# Patient Record
Sex: FEMALE | Race: WHITE
[De-identification: names, ages, dates, MRNs, and addresses within clinical notes are randomized per-mention and may not be internally consistent; named-entity substitution may affect disease eponyms.]

---

## 2017-03-16 ENCOUNTER — HOSPITAL ENCOUNTER (OUTPATIENT)
Dept: HOSPITAL 47 - RADMAMWWP | Age: 52
Discharge: HOME | End: 2017-03-16
Attending: INTERNAL MEDICINE
Payer: COMMERCIAL

## 2017-03-16 DIAGNOSIS — Z12.31: Primary | ICD-10-CM

## 2017-03-27 NOTE — MM
Reason for exam: screening  (asymptomatic).

Last mammogram was performed 5 years and 4 months ago.



History:

Patient is postmenopausal.

Family history of breast cancer in paternal grandmother at age 80.

Benign excisional biopsy of the right breast.



Physical Findings:

A clinical breast exam by your physician is recommended on an annual basis and 

results should be correlated with mammographic findings.



MG Screening Mammo w CAD

Bilateral CC and MLO view(s) were taken.

Prior study comparison: November 1, 2011, mammogram, performed at [place].

The breast tissue is heterogeneously dense. This may lower the sensitivity of 

mammography.  There is no discrete abnormality.  No significant changes when 

compared with prior studies.





ASSESSMENT: Benign, BI-RAD 2



RECOMMENDATION:

Routine screening mammogram of both breasts in 1 year.

## 2018-04-11 ENCOUNTER — HOSPITAL ENCOUNTER (OUTPATIENT)
Dept: HOSPITAL 47 - RADMAMWWP | Age: 53
Discharge: HOME | End: 2018-04-11
Attending: INTERNAL MEDICINE
Payer: COMMERCIAL

## 2018-04-11 DIAGNOSIS — N63.20: Primary | ICD-10-CM

## 2018-04-11 PROCEDURE — 77066 DX MAMMO INCL CAD BI: CPT

## 2018-04-11 NOTE — USB
Reason for exam: clinical finding.



History:

Patient is postmenopausal.

Family history of breast cancer in paternal grandmother at age 80.

Benign excisional biopsy of the right breast.

Indicated problem(s): lump or thickening in the left breast.



US Breast LT

Left breast ultrasound includes all four quadrants, the retroareolar region and 

axilla. Finding demonstrates no cystic or solid lesion seen.



These results were verbally communicated with the patient and result sheet given 

to the patient on 4/11/18.





ASSESSMENT: Negative, BI-RAD 1



RECOMMENDATION:

Routine screening mammogram of both breasts in 1 year.

Manage on a clinical basis with regard to palpable abnormality.

## 2018-04-11 NOTE — MM
Reason for exam: clinical finding.

Last mammogram was performed 1 year and 1 month ago.



History:

Patient is postmenopausal.

Family history of breast cancer in paternal grandmother at age 80.

Benign excisional biopsy of the right breast.

Indicated problem(s): lump or thickening in the left breast.



Physical Findings:

Nurse did not find any significant physical abnormalities on exam.



MG Diagnostic Mammo w CAD [facility]AN[facility]

Bilateral CC and MLO view(s) were taken.

Prior study comparison: March 16, 2017, bilateral MG screening mammo w CAD.  

November 1, 2011, mammogram, performed at [place].

There are scattered fibroglandular densities.  There is no discrete abnormality.

No significant new findings when compared with previous films.



These results were verbally communicated with the patient and result sheet given 

to the patient on 4/11/18.





ASSESSMENT: Benign, BI-RAD 2



RECOMMENDATION:

Routine screening mammogram of both breasts in 1 year.

Manage on a clinical basis with regard to palpable abnormality.

## 2020-07-29 ENCOUNTER — HOSPITAL ENCOUNTER (OUTPATIENT)
Dept: HOSPITAL 47 - LABWHC1 | Age: 55
Discharge: HOME | End: 2020-07-29
Attending: FAMILY MEDICINE
Payer: COMMERCIAL

## 2020-07-29 DIAGNOSIS — E11.9: Primary | ICD-10-CM

## 2020-07-29 DIAGNOSIS — I10: ICD-10-CM

## 2020-07-29 LAB
CHOLEST SERPL-MCNC: 216 MG/DL (ref 0–200)
HBA1C MFR BLD: 7 % (ref 4–6)
HDLC SERPL-MCNC: 52 MG/DL (ref 40–60)
LDLC SERPL CALC-MCNC: 133.4 MG/DL (ref 0–131)
TRIGL SERPL-MCNC: 153 MG/DL (ref 0–149)
VLDLC SERPL CALC-MCNC: 30.6 MG/DL (ref 5–40)

## 2020-07-29 PROCEDURE — 80061 LIPID PANEL: CPT

## 2020-07-29 PROCEDURE — 83036 HEMOGLOBIN GLYCOSYLATED A1C: CPT

## 2020-07-29 PROCEDURE — 36415 COLL VENOUS BLD VENIPUNCTURE: CPT

## 2022-07-07 ENCOUNTER — HOSPITAL ENCOUNTER (INPATIENT)
Dept: HOSPITAL 47 - EC | Age: 57
LOS: 12 days | Discharge: HOME | DRG: 885 | End: 2022-07-19
Attending: PSYCHIATRY & NEUROLOGY | Admitting: PSYCHIATRY & NEUROLOGY
Payer: MEDICAID

## 2022-07-07 DIAGNOSIS — Z71.89: ICD-10-CM

## 2022-07-07 DIAGNOSIS — Z28.21: ICD-10-CM

## 2022-07-07 DIAGNOSIS — Z91.83: ICD-10-CM

## 2022-07-07 DIAGNOSIS — Z63.5: ICD-10-CM

## 2022-07-07 DIAGNOSIS — Z59.00: ICD-10-CM

## 2022-07-07 DIAGNOSIS — R42: ICD-10-CM

## 2022-07-07 DIAGNOSIS — F25.0: Primary | ICD-10-CM

## 2022-07-07 DIAGNOSIS — G47.00: ICD-10-CM

## 2022-07-07 DIAGNOSIS — T43.595A: ICD-10-CM

## 2022-07-07 DIAGNOSIS — F41.9: ICD-10-CM

## 2022-07-07 DIAGNOSIS — Z20.822: ICD-10-CM

## 2022-07-07 DIAGNOSIS — F17.210: ICD-10-CM

## 2022-07-07 LAB
PH UR: 6.5 [PH] (ref 5–8)
RBC UR QL: 1 /HPF (ref 0–5)
SP GR UR: 1.02 (ref 1–1.03)
SQUAMOUS UR QL AUTO: 2 /HPF (ref 0–4)
UROBILINOGEN UR QL STRIP: 2 MG/DL (ref ?–2)
WBC #/AREA URNS HPF: 7 /HPF (ref 0–5)

## 2022-07-07 PROCEDURE — 82075 ASSAY OF BREATH ETHANOL: CPT

## 2022-07-07 PROCEDURE — 82248 BILIRUBIN DIRECT: CPT

## 2022-07-07 PROCEDURE — 81025 URINE PREGNANCY TEST: CPT

## 2022-07-07 PROCEDURE — 85025 COMPLETE CBC W/AUTO DIFF WBC: CPT

## 2022-07-07 PROCEDURE — 87635 SARS-COV-2 COVID-19 AMP PRB: CPT

## 2022-07-07 PROCEDURE — 81001 URINALYSIS AUTO W/SCOPE: CPT

## 2022-07-07 PROCEDURE — 80053 COMPREHEN METABOLIC PANEL: CPT

## 2022-07-07 PROCEDURE — 99285 EMERGENCY DEPT VISIT HI MDM: CPT

## 2022-07-07 PROCEDURE — 80061 LIPID PANEL: CPT

## 2022-07-07 PROCEDURE — 80306 DRUG TEST PRSMV INSTRMNT: CPT

## 2022-07-07 PROCEDURE — 84443 ASSAY THYROID STIM HORMONE: CPT

## 2022-07-07 PROCEDURE — 83036 HEMOGLOBIN GLYCOSYLATED A1C: CPT

## 2022-07-07 SDOH — ECONOMIC STABILITY - HOUSING INSECURITY: HOMELESSNESS UNSPECIFIED: Z59.00

## 2022-07-07 SDOH — SOCIAL STABILITY - SOCIAL INSECURITY: DISRUPTION OF FAMILY BY SEPARATION AND DIVORCE: Z63.5

## 2022-07-07 NOTE — ED
Psych HPI





- General


Source: patient, police, RN notes reviewed


Mode of arrival: ambulatory


Limitations: no limitations





<Renny Crowley - Last Filed: 07/07/22 11:43>





<Rashad Faulkner - Last Filed: 07/07/22 16:05>





- General


Chief Complaint: Psychiatric Symptoms


Stated Complaint: Mental Health  order


Time Seen by Provider: 07/07/22 07:49





- History of Present Illness


Initial Comments: 


This a 57-year-old female presents emergency Department with police for 

psychiatric evaluation.  Patient was petitioned by family member as she's had a 

manic bizarre behavior.  Patient's been having some paranoia.  Patient's no 

prior much information as she has severe flight of ideas, no insight on current 

condition.  She denies any medications she is not given Ancef she has any 

diagnosis of schizophrenia bipolar disorder.  She does admit to some alcohol use

occasionally no drug abuse.  Patient denies any physical complaints.


 (Renny Crowley)





- Related Data


                                    Allergies











Allergy/AdvReac Type Severity Reaction Status Date / Time


 


No Known Allergies Allergy   Verified 07/07/22 12:37














Review of Systems


ROS Other: All systems not noted in ROS Statement are negative.





<Renny Crowley - Last Filed: 07/07/22 11:43>


ROS Other: All systems not noted in ROS Statement are negative.





<Rashad Faulkner - Last Filed: 07/07/22 16:05>


ROS Statement: 


Those systems with pertinent positive or pertinent negative responses have been 

documented in the HPI.








Past Medical History


Past Medical History: No Reported History


History of Any Multi-Drug Resistant Organisms: None Reported


Past Surgical History: No Surgical Hx Reported


Past Psychological History: Anxiety


Smoking Status: Current every day smoker


Past Alcohol Use History: Occasional


Past Drug Use History: None Reported





<Renny Crowley - Last Filed: 07/07/22 11:43>





General Exam


Limitations: no limitations


General appearance: alert, in no apparent distress


Head exam: Present: atraumatic, normocephalic, normal inspection


Eye exam: Present: normal appearance, PERRL, EOMI.  Absent: scleral icterus, 

conjunctival injection, periorbital swelling


ENT exam: Present: normal exam, normal oropharynx, mucous membranes moist


Neck exam: Present: normal inspection, full ROM.  Absent: tenderness, 

meningismus, lymphadenopathy


Respiratory exam: Present: normal lung sounds bilaterally.  Absent: respiratory 

distress, wheezes, rales, rhonchi, stridor


Cardiovascular Exam: Present: regular rate, normal rhythm, normal heart sounds. 

Absent: systolic murmur, diastolic murmur, rubs, gallop, clicks


Neurological exam: Present: alert


Psychiatric exam: Present: manic, other (Patient has severe flight of ideas, 

rambling thoughts.)





<Renny Crowley - Last Filed: 07/07/22 11:43>





Course





                                   Vital Signs











  07/07/22





  07:46


 


Temperature 98.5 F


 


Pulse Rate 101 H


 


Respiratory 18





Rate 


 


Blood Pressure 126/83


 


O2 Sat by Pulse 98





Oximetry 














Medical Decision Making





<Rashad Faulkner - Last Filed: 07/07/22 16:05>





- Medical Decision Making





Patient will be admitted to inpatient psych. I completed the cert after 

evaluation of the patient. (Rashad Faulkner)





- Lab Data





                                   Lab Results











  07/07/22 07/07/22 Range/Units





  08:20 13:10 


 


Urine Opiates Screen  Not Detected   (NotDetected)  


 


Ur Oxycodone Screen  Not Detected   (NotDetected)  


 


Urine Methadone Screen  Not Detected   (NotDetected)  


 


Ur Propoxyphene Screen  Not Detected   (NotDetected)  


 


Ur Barbiturates Screen  Not Detected   (NotDetected)  


 


U Tricyclic Antidepress  Not Detected   (NotDetected)  


 


Ur Phencyclidine Scrn  Not Detected   (NotDetected)  


 


Ur Amphetamines Screen  Not Detected   (NotDetected)  


 


U Methamphetamines Scrn  Not Detected   (NotDetected)  


 


U Benzodiazepines Scrn  Not Detected   (NotDetected)  


 


Urine Cocaine Screen  Not Detected   (NotDetected)  


 


U Marijuana (THC) Screen  Not Detected   (NotDetected)  


 


Coronavirus (PCR)   Not Detected  (Not Detectd)  














Disposition


Time of Disposition: 11:57





<Renny Crowley - Last Filed: 07/07/22 11:43>





<Rashad Faulkner - Last Filed: 07/07/22 16:05>


Clinical Impression: 


 Bipolar disorder





Disposition: TRANSFER TO PSYCH HOSP/UNIT


Condition: Fair

## 2022-07-08 LAB
ALBUMIN SERPL-MCNC: 4.5 G/DL (ref 3.5–5)
ALP SERPL-CCNC: 85 U/L (ref 38–126)
ALT SERPL-CCNC: 18 U/L (ref 4–34)
ANION GAP SERPL CALC-SCNC: 9 MMOL/L
AST SERPL-CCNC: 24 U/L (ref 14–36)
BASOPHILS # BLD AUTO: 0.1 K/UL (ref 0–0.2)
BASOPHILS NFR BLD AUTO: 1 %
BILIRUB INDIRECT SERPL-MCNC: 0.4 MG/DL (ref 0–1.1)
BILIRUBIN DIRECT+TOT PNL SERPL-MCNC: 0.1 MG/DL (ref 0–0.2)
BUN SERPL-SCNC: 14 MG/DL (ref 7–17)
CALCIUM SPEC-MCNC: 9 MG/DL (ref 8.4–10.2)
CHLORIDE SERPL-SCNC: 104 MMOL/L (ref 98–107)
CHOLEST SERPL-MCNC: 209 MG/DL (ref 0–200)
CO2 SERPL-SCNC: 24 MMOL/L (ref 22–30)
EOSINOPHIL # BLD AUTO: 0.1 K/UL (ref 0–0.7)
EOSINOPHIL NFR BLD AUTO: 1 %
ERYTHROCYTE [DISTWIDTH] IN BLOOD BY AUTOMATED COUNT: 4.52 M/UL (ref 3.8–5.4)
ERYTHROCYTE [DISTWIDTH] IN BLOOD: 12.1 % (ref 11.5–15.5)
GLUCOSE SERPL-MCNC: 99 MG/DL (ref 74–99)
HCT VFR BLD AUTO: 41.2 % (ref 34–46)
HDLC SERPL-MCNC: 57.5 MG/DL (ref 40–60)
HGB BLD-MCNC: 13.7 GM/DL (ref 11.4–16)
LDLC SERPL CALC-MCNC: 124.9 MG/DL (ref 0–131)
LYMPHOCYTES # SPEC AUTO: 2.6 K/UL (ref 1–4.8)
LYMPHOCYTES NFR SPEC AUTO: 25 %
MCH RBC QN AUTO: 30.3 PG (ref 25–35)
MCHC RBC AUTO-ENTMCNC: 33.3 G/DL (ref 31–37)
MCV RBC AUTO: 91.1 FL (ref 80–100)
MONOCYTES # BLD AUTO: 0.5 K/UL (ref 0–1)
MONOCYTES NFR BLD AUTO: 5 %
NEUTROPHILS # BLD AUTO: 7.2 K/UL (ref 1.3–7.7)
NEUTROPHILS NFR BLD AUTO: 68 %
PLATELET # BLD AUTO: 243 K/UL (ref 150–450)
POTASSIUM SERPL-SCNC: 4.3 MMOL/L (ref 3.5–5.1)
PROT SERPL-MCNC: 7.2 G/DL (ref 6.3–8.2)
SODIUM SERPL-SCNC: 137 MMOL/L (ref 137–145)
TRIGL SERPL-MCNC: 133 MG/DL (ref 0–149)
VLDLC SERPL CALC-MCNC: 26.6 MG/DL (ref 5–40)
WBC # BLD AUTO: 10.6 K/UL (ref 3.8–10.6)

## 2022-07-08 RX ADMIN — DIVALPROEX SODIUM SCH MG: 250 TABLET, FILM COATED, EXTENDED RELEASE ORAL at 16:05

## 2022-07-08 RX ADMIN — PALIPERIDONE SCH MG: 3 TABLET, EXTENDED RELEASE ORAL at 21:15

## 2022-07-08 RX ADMIN — DIVALPROEX SODIUM SCH MG: 250 TABLET, FILM COATED, EXTENDED RELEASE ORAL at 21:15

## 2022-07-08 RX ADMIN — DIVALPROEX SODIUM SCH: 250 TABLET, FILM COATED, EXTENDED RELEASE ORAL at 13:33

## 2022-07-08 NOTE — P.MDCNMH
History of Present Illness


H&P Date: 07/08/22











This is a 57-year-old female male who was recently brought into the emergency 

department by the police as patient was having manic bazaar behavior as noted by

family at home and patient was petitioned by family member and also having some 

increased paranoia and anxiety.  Patient hesitant to provide any medical 

information on exam although did make mention that she follows or did follow 

with Dr. Clare Alvarez in the outpatient setting and has been doing relatively 

well off any medications and did not need any medications for anything.  Patient

denies any medical history and also mentioned that she will contact us or a 

medical doctor if she seeks medical attention.  Patient reported to smoking and 

occasional drinking and denies any other drug use.  Patient is admitted on [address] 

psychiatric unit petitioned for psychiatric evaluation.  Patient is slightly 

anxious on exam and still exhibiting signs of paranoia.  During the exam patient

also reported she does not want Dr. Alvarez to be notified of this admission.





Review Of Systems:





Constitutional: No fever, no chills, no night sweats.  No weight change.  No 

weakness, fatigue or lethargy.  No daytime sleepiness.


EENT: No headache.  No blurred vision or double vision, no loss of vision.  No 

loss of Hearing, no ringing in the ears, no dizziness.  No nasal drainage or 

congestion.  No epistaxis.  No sore throat.


Lungs: No shortness of breath, cough, no sputum production.  No wheezing.


Cardiovascular: No chest pain, no lower extremity edema.  No palpitations.  No 

paroxysmal nocturnal dyspnea.  No orthopnea.  No lightheadedness or dizziness.  

No syncopal episodes.


Abdominal: Reports some mild lower right quadrant abdominal pain.  No nausea, 

vomiting.  No diarrhea.  No constipation.  No bloody or tarry stools..  No loss 

of appetite.


Genitourinary: No dysuria, increased frequency, urgency.  No urinary retention.


Musculoskeletal: No myalgias.  No muscle weakness, no gait dysfunction, no 

frequent falls.  No back pain.  No neck pain.


Integumentary: No wounds, no lesions.  No rash or pruritus.  No unusual 

bruising.  No change in hair or nails.


Neurologic: No aphasia. No facial droop. No change in mentation. No head injury.

No headache. No paralysis. No paresthesia.


Psychiatric: No depression.  No anxiety.  No mood swings.


Endocrine: No abnormal blood sugars.  No weight change.  No excessive sweating 

or thirst.  No cold intolerance.  











PHYSICAL EXAMINATION: 





GENERAL: The patient is alert and oriented x4,  anxious, paranoid, Well 

developed, well nourished. 


HEENT: Pupils are round and equally reacting to light. EOMI. no scleral icterus.

No conjunctival pallor. Normocephalic, atraumatic. No pharyngeal erythema. No 

thyromegaly.  


CARDIOVASCULAR: S1 and S2  muffled 


PULMONARY: Clear to auscultation with no wheezing or rhonchi noted. 


ABDOMEN: soft.  non-distended, normoactive bowel sounds. No palpable 

organomegaly.  Abdominal binder noted


MUSCULOSKELETAL: No joint swelling or deformity.


EXTREMITIES: No cyanosis, clubbing, or pedal edema. 


NEUROLOGICAL: Gross neurological examination did not reveal any focal deficits. 

Steady gait


SKIN: No rashes. 





Assessment:





Manic episode with psychotic features


Racing thoughts


Anxiety


Possible bipolar disorder


Continued ongoing nicotine dependence


Petition by family for bizarre behavior


Full code





Plan:





Recommend to continue with current medications and management per psychiatry 

services.  Attempted to perform a complete history and physical on the patient 

while admitted to the inpatient psychiatric unit and patient initially was 

agreeable to the visit although became more paranoid and making multiple 

comments that she does not need medications or medical services and does not 

want her primary care provider Dr. Alvarez to be notified of this admission.  

Patient denies any chest pain or shortness of breath, denies lightheadedness or 

dizziness, reports she is not taking any medications in the outpatient setting, 

and denies any nausea or vomiting and is tolerating diet.  Attempted to 

encourage the patient to follow-up with primary care provider to discuss any med

ications once discharged from the psychiatric unit.  She has been petitioned by 

family and brought here by police and involuntarily signed into the psychiatric 

unit.  Labs were reviewed and within normal limits and urine drug screen along 

with Covid that was also reviewed and negative.  Thank you for this consultation

and please do not hesitate to contact us if needed.








The impression and plan of care has been dictated by Yari Stauffer, nurse 

practitioner as directed.





Dr. Rand MD


I have performed a history and examination and MDM of this patient, discussed 

the same with the dictator, and  agree with the dictator's assessment and plan 

as written ,documented as a scribe. Based on total visit time,  I have performed

more than 50% of the visit. Any additional findings or plans will be noted. 





Past Medical History


Past Medical History: No Reported History


History of Any Multi-Drug Resistant Organisms: None Reported


Past Surgical History: No Surgical Hx Reported


Past Anesthesia/Blood Transfusion Reactions: No Reported Reaction


Past Psychological History: Anxiety


Smoking Status: Current every day smoker


Past Alcohol Use History: None Reported, Occasional


Past Drug Use History: None Reported





Medications and Allergies


                                Home Medications











 Medication  Instructions  Recorded  Confirmed  Type


 


No Known Home Medications  07/07/22 07/07/22 History








                                    Allergies











Allergy/AdvReac Type Severity Reaction Status Date / Time


 


No Known Allergies Allergy   Verified 07/07/22 12:37














Physical Exam


Vitals: 


                                   Vital Signs











  Temp Pulse Pulse Resp BP BP Pulse Ox


 


 07/08/22 07:12  97.9 F   109 H  18   120/85  98


 


 07/07/22 15:57  98 F   97  18   156/85  96


 


 07/07/22 14:16   94   18  124/81   96








                                Intake and Output











 07/07/22 07/08/22 07/08/22





 22:59 06:59 14:59


 


Other:   


 


  Weight 73.074 kg  














Cranial Nerve Examination





- Cranial Nerves


Cranial Nerve I- Olfactory: Intact


Cranial Nerve II- Optic: Intact


Cranial Nerve III- Oculomotor: Intact


Cranial Nerve IV- Trochlear: Intact


Cranial Nerve V- Trigeminal: Intact


Cranial Nerve VI- Abducens: Intact


Cranial Nerve VII- Facial: Intact


Cranial Nerve VIII- Auditory: Intact


Cranial Nerve IX- Glossopharyngeal: Intact


Cranial Nerve X- Vagus: Intact


Cranial Nerve XI- Accessory: Intact


Cranial Nerve XII- Hypoglossal: Intact





Results


CBC & Chem 7: 


                                 07/08/22 10:27





                                 07/08/22 10:27


Labs: 


                  Abnormal Lab Results - Last 24 Hours (Table)











  07/07/22 Range/Units





  08:20 


 


Urine Appearance  Cloudy H  (Clear)  


 


Urine Protein  Trace H  (Negative)  


 


Urine WBC  7 H  (0-5)  /hpf


 


Urine Bacteria  Occasional H  (None)  /hpf


 


Urine Mucus  Few H  (None)  /hpf














Assessment and Plan


Time with Patient: Less than 30

## 2022-07-08 NOTE — P.HP
Psychiatric H&P





- .


H&P Date: 07/08/22


History & Physical: 


                                    Allergies











Allergy/AdvReac Type Severity Reaction Status Date / Time


 


No Known Allergies Allergy   Verified 07/07/22 12:37








                                   Vital Signs











Temp  97.9 F   07/08/22 07:12


 


Pulse  109 H  07/08/22 07:12


 


Resp  18   07/08/22 07:12


 


BP  120/85   07/08/22 07:12


 


Pulse Ox  98   07/08/22 07:12


 


FiO2      








                                 Intake & Output











 07/07/22 07/08/22 07/08/22





 18:59 06:59 18:59


 


Weight 73.074 kg  








                             Laboratory Last Values











WBC  10.6 k/uL (3.8-10.6)   07/08/22  10:27    


 


RBC  4.52 m/uL (3.80-5.40)   07/08/22  10:27    


 


Hgb  13.7 gm/dL (11.4-16.0)   07/08/22  10:27    


 


Hct  41.2 % (34.0-46.0)   07/08/22  10:27    


 


MCV  91.1 fL (80.0-100.0)   07/08/22  10:27    


 


MCH  30.3 pg (25.0-35.0)   07/08/22  10:27    


 


MCHC  33.3 g/dL (31.0-37.0)   07/08/22  10:27    


 


RDW  12.1 % (11.5-15.5)   07/08/22  10:27    


 


Plt Count  243 k/uL (150-450)   07/08/22  10:27    


 


MPV  8.4   07/08/22  10:27    


 


Neutrophils %  68 %  07/08/22  10:27    


 


Lymphocytes %  25 %  07/08/22  10:27    


 


Monocytes %  5 %  07/08/22  10:27    


 


Eosinophils %  1 %  07/08/22  10:27    


 


Basophils %  1 %  07/08/22  10:27    


 


Neutrophils #  7.2 k/uL (1.3-7.7)   07/08/22  10:27    


 


Lymphocytes #  2.6 k/uL (1.0-4.8)   07/08/22  10:27    


 


Monocytes #  0.5 k/uL (0-1.0)   07/08/22  10:27    


 


Eosinophils #  0.1 k/uL (0-0.7)   07/08/22  10:27    


 


Basophils #  0.1 k/uL (0-0.2)   07/08/22  10:27    


 


Urine Color  Yellow   07/07/22  08:20    


 


Urine Appearance  Cloudy  (Clear)  H  07/07/22  08:20    


 


Urine pH  6.5  (5.0-8.0)   07/07/22  08:20    


 


Ur Specific Gravity  1.025  (1.001-1.035)   07/07/22  08:20    


 


Urine Protein  Trace  (Negative)  H  07/07/22  08:20    


 


Urine Glucose (UA)  Negative  (Negative)   07/07/22  08:20    


 


Urine Ketones  Negative  (Negative)   07/07/22  08:20    


 


Urine Blood  Negative  (Negative)   07/07/22  08:20    


 


Urine Nitrite  Negative  (Negative)   07/07/22  08:20    


 


Urine Bilirubin  Negative  (Negative)   07/07/22  08:20    


 


Urine Urobilinogen  2.0 mg/dL (<2.0)   07/07/22  08:20    


 


Ur Leukocyte Esterase  Negative  (Negative)   07/07/22  08:20    


 


Urine RBC  1 /hpf (0-5)   07/07/22  08:20    


 


Urine WBC  7 /hpf (0-5)  H  07/07/22  08:20    


 


Ur Squamous Epith Cells  2 /hpf (0-4)   07/07/22  08:20    


 


Urine Bacteria  Occasional /hpf (None)  H  07/07/22  08:20    


 


Urine Mucus  Few /hpf (None)  H  07/07/22  08:20    


 


Urine HCG, Qual  Not Detected  (Not Detectd)   07/07/22  08:20    


 


Urine Opiates Screen  Not Detected  (NotDetected)   07/07/22  08:20    


 


Ur Oxycodone Screen  Not Detected  (NotDetected)   07/07/22  08:20    


 


Urine Methadone Screen  Not Detected  (NotDetected)   07/07/22  08:20    


 


Ur Propoxyphene Screen  Not Detected  (NotDetected)   07/07/22  08:20    


 


Ur Barbiturates Screen  Not Detected  (NotDetected)   07/07/22  08:20    


 


U Tricyclic Antidepress  Not Detected  (NotDetected)   07/07/22  08:20    


 


Ur Phencyclidine Scrn  Not Detected  (NotDetected)   07/07/22  08:20    


 


Ur Amphetamines Screen  Not Detected  (NotDetected)   07/07/22  08:20    


 


U Methamphetamines Scrn  Not Detected  (NotDetected)   07/07/22  08:20    


 


U Benzodiazepines Scrn  Not Detected  (NotDetected)   07/07/22  08:20    


 


Urine Cocaine Screen  Not Detected  (NotDetected)   07/07/22  08:20    


 


U Marijuana (THC) Screen  Not Detected  (NotDetected)   07/07/22  08:20    


 


Coronavirus (PCR)  Not Detected  (Not Detectd)   07/07/22  13:10    











07/08/22 11:31


IDENTIFYING DATA: Patient is a 57-year-old  female currently ,

homeless and has one son.





HPI: Patient presented to the hospital yesterday with police escort for 

psychiatric evaluation.  Patient apparently was petitioned by her family.  

Patient was seen to be disorganized, pressured in speech and also bizarre.  

Patient was admitted involuntarily to the mental health unit.  Patient's UDS was

negative.  Patient was seen today by writer in the office and agreeable to 

speak.  Patient had pressured speech, rambles flight of ideas.  She was 

tangential/circumstantial.  She spoke about trying to get to a shelter and 

claims that she is "in between houses".  She states that she is having financial

difficulties and was not able to pay her rent.  She states that she was walking 

down the side of the road with her bags in her hand and was pulled over by the 

police and taken to the hospital.  She states that she does not know why.  She 

has fairly poor insight into her condition.  She does state that she has a 

history of bipolar disorder however took herself off her medication states that 

"I don't think I need them".  She is grandiose and claims that she is "high 

strung".  She is denying any depression today.  Denying any anxiety.  She does 

endorse some paranoia.  She also is endorsing auditory hallucinations coming 

through the "baseboards in the wall".  She states her sleep has been fair 

appetite as been fair. Patient denies any suicidal or homicidal ideations intent

or plan.  At this time patient denies any visual hallucinations. Patient admits 

to using cigarettes daily.





PAST PSYCHIATRIC HISTORY: Patient states that she has a history of bipolar 

disorder. Patient denies being on any psychiatric medications. Patient denies 

any previous psychiatric hospitalizations. Patient denies any psychiatric 

outpatient follow-up. Patient denies any history of suicide attempts in the 

past.





PMH:denies





ALLERGIES: as per EMR





CHEMICAL DEPENDENCY HISTORY: as per HPI





FAMILY PSYCHIATRIC/SUBSTANCE USE HISTORY:  denies





SOCIAL HISTORY: Patient was born and raised in [place].  She states that she 

completed high school and also her bachelor's degree.  She was unreliable 

historian of her social history.





MENTAL STATUS EXAM: 


General Appearance: Patient appears to be dressed in home in street clothing, 

stated age is alert, difficult to redirect. Patient appears to have poor hygiene

and grooming.


Behavior: Patient is seated without any agitated behavior.  Difficult to 

redirect.


Speech: Patient's speech is rapid/pressured.  Rambles.


Mood/Affect: Patient reports their mood is "okay", affect is congruent and 

constricted. 


Suicidality/Homicidality:  Patient denies having any homicidal ideation intent 

or plan. Denies any suicidal ideations intent or plan  


Perceptions: Patient denies any visual hallucinations at [facility] auditory 

hallucinations.


Though content/process: Tangential/circumstantial.  Rambling, flight of ideas.  

Grandiosity. 


Memory and concentration: AOX3, grossly intact for the purposes of this session.

Can spell "WORLD" backwards


Judgment and insight: poor





STRENGTHS/WEAKNESSES: strength is that patient is resilient. Weakness is that 

patient has poor judgment and is impulsive





INTELLECT: average





IMPRESSIONS: 


Bipolar disorder, current episode kia with psychotic features





PLAN: 


-Patient is admitted under involuntary status to MHU for stabilization of 

psychiatric symptoms and safety. Patient has not signed adult voluntary form and

medication consent and is placed in patient's chart. A second certification was 

completed and along with petition will be filed for court.


-Medications : Will start patient on Depakote 250 mg 3 times a day for mood 

stabilization, paliperidone by mouth 3 mg daily at bedtime for mood 

stabilization/psychosis.


-Ativan and Haldol PRN for agitation/aggression


-Patient was informed of the risks, benefits and side effects of the medication 


-Internal Medicine consult to perform medical evaluation and physical.


-NRT - nicotine patch


-SW on board for discharge planning. Encourage patient to participate in groups 

to work on coping skills. Will await deferral and court date. 


07/08/22 13:10

## 2022-07-09 RX ADMIN — DIVALPROEX SODIUM SCH MG: 250 TABLET, FILM COATED, EXTENDED RELEASE ORAL at 08:38

## 2022-07-09 RX ADMIN — DIVALPROEX SODIUM SCH MG: 250 TABLET, FILM COATED, EXTENDED RELEASE ORAL at 16:17

## 2022-07-09 RX ADMIN — DIVALPROEX SODIUM SCH MG: 250 TABLET, FILM COATED, EXTENDED RELEASE ORAL at 20:39

## 2022-07-09 RX ADMIN — NICOTINE SCH: 14 PATCH, EXTENDED RELEASE TRANSDERMAL at 08:37

## 2022-07-09 RX ADMIN — PALIPERIDONE SCH MG: 3 TABLET, EXTENDED RELEASE ORAL at 20:39

## 2022-07-09 NOTE — P.PN
Subjective


Progress Note Date: 07/09/22


Principal diagnosis: 








IMPRESSIONS: 


Bipolar disorder, current episode kia with psychotic features


Patient is a 72 poor historian and continues to ramble with circumstantiality an

d tangentiality and is unable to give any specific information


Patient reports that she does not call hospitalization as hospitals but more 

like different residence and that she has been changing her residence multiple 

times


She states that she has been in this residence few of the times


She stated that she is also trying to change her residence to get a bigger place

so she can accommodate more people


Patient continues to ramble on with information that remains noncontributory and

projective





Reviewing the chart from the previous day reveals the following information:





'HPI: Patient presented to the hospital yesterday with police escort for 

psychiatric evaluation.  Patient apparently was petitioned by her family.  

Patient was seen to be disorganized, pressured in speech and also bizarre.  

Patient was admitted involuntarily to the mental health unit.  Patient's UDS was

negative.  Patient was seen today by writer in the office and agreeable to 

speak.  Patient had pressured speech, rambles flight of ideas.  She was 

tangential/circumstantial.  She spoke about trying to get to a shelter and 

claims that she is "in between houses".  She states that she is having financial

difficulties and was not able to pay her rent.  She states that she was walking 

down the side of the road with her bags in her hand and was pulled over by the 

police and taken to the hospital.  She states that she does not know why.  She 

has fairly poor insight into her condition.  She does state that she has a his

tory of bipolar disorder however took herself off her medication states that "I 

don't think I need them".  She is grandiose and claims that she is "high 

strung".  She is denying any depression today.  Denying any anxiety.  She does 

endorse some paranoia.  She also is endorsing auditory hallucinations coming 

through the "baseboards in the wall".  She states her sleep has been fair 

appetite as been fair. Patient denies any suicidal or homicidal ideations intent

or plan.  At this time patient denies any visual hallucinations. Patient admits 

to using cigarettes daily.








MENTAL STATUS EXAM: 


General Appearance: Patient appears to be dressed in home in street clothing, 

stated age is alert, difficult to redirect. Patient appears to have poor hygiene

and grooming.


Behavior: Patient is seated without any agitated behavior.  Difficult to 

redirect.


Speech: Patient's speech is rapid/pressured.  Rambles.


Mood/Affect: Patient reports their mood is "okay", affect is congruent and 

constricted. 


Suicidality/Homicidality:  Patient denies having any homicidal ideation intent 

or plan. Denies any suicidal ideations intent or plan  


Perceptions: Patient denies any visual hallucinations at [place] auditory ha

llucinations.


Though content/process: Tangential/circumstantial.  Rambling, flight of ideas.  

Grandiosity. 


Memory and concentration: AOX3, grossly intact for the purposes of this session.

Can spell "WORLD" backwards


Judgment and insight: poor





STRENGTHS/WEAKNESSES: strength is that patient is resilient. Weakness is that 

patient has poor judgment and is impulsive





INTELLECT: average





IMPRESSIONS: 


Bipolar disorder, current episode kia with psychotic features





PLAN: 


-Patient is admitted under involuntary status to MHU for stabilization of 

psychiatric symptoms and safety.


 Patient has not signed adult voluntary form and medication consent and is 

placed in patient's chart.


 A second certification as also being completed and along with petition will be 

filed for court.


-Medications : Patient has been started on on Depakote 250 mg 3 times a day for 

mood stabilization,


 paliperidone by mouth 3 mg daily at bedtime for mood stabilization/psychosis.


-Ativan and Haldol PRN for agitation/aggression


-Patient was informed of the risks, benefits and side effects of the medication 


-Internal Medicine consult to perform medical evaluation and physical.


-NRT - nicotine patch


-SW on board for discharge planning. Encourage patient to participate in groups 

to work on coping skills. Will await deferral and court date. 


Louis Hannah M.D.


7/9/2022














Objective





- Vital Signs


Vital signs: 


                                   Vital Signs











Temp  97.8 F   07/09/22 06:56


 


Pulse  103 H  07/09/22 06:56


 


Resp  18   07/08/22 07:12


 


BP  147/60   07/09/22 06:56


 


Pulse Ox  97   07/09/22 06:56


 


FiO2      














- Labs


CBC & Chem 7: 


                                 07/08/22 10:27





                                 07/08/22 10:27


Labs: 


                  Abnormal Lab Results - Last 24 Hours (Table)











  07/08/22 Range/Units





  10:27 


 


Cholesterol  209.00 H  (0..00)  mg/dL

## 2022-07-10 RX ADMIN — PALIPERIDONE SCH MG: 3 TABLET, EXTENDED RELEASE ORAL at 21:44

## 2022-07-10 RX ADMIN — DIVALPROEX SODIUM SCH MG: 250 TABLET, FILM COATED, EXTENDED RELEASE ORAL at 08:30

## 2022-07-10 RX ADMIN — NICOTINE SCH: 14 PATCH, EXTENDED RELEASE TRANSDERMAL at 08:30

## 2022-07-10 RX ADMIN — DIVALPROEX SODIUM SCH MG: 250 TABLET, FILM COATED, EXTENDED RELEASE ORAL at 21:44

## 2022-07-10 RX ADMIN — DIVALPROEX SODIUM SCH MG: 250 TABLET, FILM COATED, EXTENDED RELEASE ORAL at 16:12

## 2022-07-10 NOTE — P.PN
Subjective


Progress Note Date: 07/10/22


Principal diagnosis: 








IMPRESSIONS: 


Bipolar disorder, current episode kia with psychotic features


Patient agreed to be seen by this writer in the hallway


Patient states that she needs to be excused for a little while since she was 

talking to herself


Patient seemed to be carrying on a conversation by herself walking down the 

hallway


She seemed to be responding to internal stimuli


She denies that she has any problems or issues


Patient remains in denial and remains projective and continues to rationalize

















MENTAL STATUS EXAM: 


General Appearance: Patient appears to be dressed in hospital gown,  difficult 

to redirect. Patient appears to have poor hygiene and grooming.


Behavior: Patient is walking around without any agitated behavior.  Difficult to

redirect.


Speech: Patient's speech is rapid/pressured.  Rambles.


Mood/Affect: Patient reports their mood is "okay", affect is congruent and 

constricted. 


Suicidality/Homicidality:  Patient denies having any homicidal ideation intent 

or plan. Denies any suicidal ideations intent or plan  


Perceptions: Patient denies any visual hallucinations at [place] auditory 

hallucinations.


Though content/process: Tangential/circumstantial.  Rambling, flight of ideas.  

Grandiosity. 


Memory and concentration: AOX3, grossly intact for the purposes of this session.




Judgment and insight: poor





STRENGTHS/WEAKNESSES: strength is that patient is resilient. Weakness is that 

patient has poor judgment and is impulsive





INTELLECT: average





IMPRESSIONS: 


Bipolar disorder, current episode kia with psychotic features





PLAN: 


-Patient is admitted under involuntary status to MHU for stabilization of 

psychiatric symptoms and safety.


 Patient has not signed adult voluntary form and medication consent and is 

placed in patient's chart.


 A second certification as also being completed and along with petition will be 

filed for court.


-Medications : Patient has been started on on Depakote 250 mg 3 times a day for 

mood stabilization,


 paliperidone by mouth 3 mg daily at bedtime for mood stabilization/psychosis.


-Ativan and Haldol PRN for agitation/aggression


-Patient was informed of the risks, benefits and side effects of the medication 


-Internal Medicine consult to perform medical evaluation and physical.


-NRT - nicotine patch


-SW on board for discharge planning. Encourage patient to participate in groups 

to work on coping skills. Will await deferral and court date. 


Louis Hannah M.D.


7/10/2022














Objective





- Vital Signs


Vital signs: 


                                   Vital Signs











Temp  97.6 F   07/10/22 06:46


 


Pulse  113 H  07/10/22 06:46


 


Resp  18   07/10/22 06:46


 


BP  113/75   07/10/22 06:46


 


Pulse Ox  98   07/10/22 06:46


 


FiO2      








                                 Intake & Output











 07/09/22 07/10/22 07/10/22





 18:59 06:59 18:59


 


Weight   73.9 kg














- Labs


CBC & Chem 7: 


                                 07/08/22 10:27





                                 07/08/22 10:27

## 2022-07-11 RX ADMIN — DIVALPROEX SODIUM SCH MG: 500 TABLET, FILM COATED, EXTENDED RELEASE ORAL at 15:57

## 2022-07-11 RX ADMIN — DIVALPROEX SODIUM SCH MG: 500 TABLET, FILM COATED, EXTENDED RELEASE ORAL at 20:14

## 2022-07-11 RX ADMIN — DIVALPROEX SODIUM SCH MG: 250 TABLET, FILM COATED, EXTENDED RELEASE ORAL at 09:03

## 2022-07-11 RX ADMIN — NICOTINE SCH: 14 PATCH, EXTENDED RELEASE TRANSDERMAL at 09:04

## 2022-07-11 NOTE — PN
PROGRESS NOTE



DATE OF SERVICE:

07/11/2022



CHIEF COMPLAINT:

The patient had disorganized and bizarre behavior. She had pressured speech.  She was

admitted on an involuntary basis.



INTERVAL HISTORY:

The patient has been doing fair.  She had a quiet day yesterday though continued to be

fairly symptomatic.  She comes out on the unit.  She wanders about.  She did not attend

groups. She was noted to be responding to internal stimuli.  Staff documented that she

slept 5 hours last night. Today she has been up and out on the unit.  She continues to

wander about. She has disorganized speech.  She attended two groups to a limited

extent.  When I talked to the patient, it was difficult to engage much in conversation.

She voiced no specific complaints or concerns.  She appears to tolerate her

psychotropic medications.



MENTAL STATUS:

Patient was restless. She gave fair eye contact.  She made various comments that were

disconnected from the subject at hand.  At times she had some pressured speech and

flight of ideas.  She spoke in a rambling way and it was difficult to make sense of

most of what she was referring to.  Her affect was intense.  Her mood was quiet.  She

appeared to be somewhat distressed.  She continues to respond to internal stimuli.  I

was difficult to assess for thoughts of harm.



ASSESSMENT:

I will continue the current diagnosis and treatment plan.  I will increase Depakote up

to 500 mg twice a day.  We will get a Depakote level tomorrow morning.  I will increase

her Invega to 6 mg a day.  I briefly reviewed medication issues with the patient though

kept the discussion limited, as she was not too inclined to engage in the conversation.

We will focus on stabilization and discharge planning.





MMJEREMIAHL / CONNIEN: [id number] / Job#: [id number]

## 2022-07-12 RX ADMIN — DIVALPROEX SODIUM SCH MG: 500 TABLET, FILM COATED, EXTENDED RELEASE ORAL at 12:04

## 2022-07-12 RX ADMIN — DIVALPROEX SODIUM SCH MG: 500 TABLET, FILM COATED, EXTENDED RELEASE ORAL at 21:20

## 2022-07-12 RX ADMIN — NICOTINE SCH: 14 PATCH, EXTENDED RELEASE TRANSDERMAL at 08:48

## 2022-07-12 NOTE — P.PN
Progress Note - Text


Progress Note Date: 07/12/22





Interval History:


Patient was seen wandering the hallways and was directable and agreeable to antonino schaefer with writer in the office.  Patient continues to have rapid speech however 

this has been improving.  Continues to be tangential/circumstantial arbors 

improving mildly.  She rambles at times during conversation.  She states that 

her mood is "okay" and is denying any depression or anxiety today.  She is 

showing improvement in her grandiosity.  She continues to state that she is 

homeless and does not know where to go upon discharge.  She states that she is 

only gone to some groups.  She states that she is improving overall and is 

agreeable to continue with medications.  She states that she was able to sleep 

better last night. At this time patient denies any suicidal or homical 

ideations, intent or plan. Patient denies any auditory, visual hallucinations 

and denies any paranoia or delusions. Patient denies any side effects from the 

medications and has been compliant with meds.  She did claim that she is feeling

dizziness and intolerability to the paliperidone at nighttime and is agreeable 

to have switched.





Mental Status Exam:


General Appearance: Patient appears to be dressed in home in street clothing, 

stated age is alert, directable. Patient appears to have improving hygiene and 

grooming.


Behavior: Patient is seated without any agitated behavior.  Difficult to 

redirect


Speech: Patient's speech is rapid/pressured.  Rambles, improving mildly


Mood/Affect: Patient reports their mood is "ok", affect is congruent


Suicidality/Homicidality:  Patient denies having any homicidal ideation intent 

or plan. Denies any suicidal ideations intent or plan  


Perceptions: Patient denies any visual hallucinations and denies any auditory 

hallucinations.


Though content/process: Tangential/circumstantial.  Rambling, flight of ideas.  

Grandiosity.  Improving.


Memory and concentration: AOX3, grossly intact for the purposes of this session


Judgment and insight: poor, improving mildly.





IMPRESSIONS: 


Bipolar disorder, current episode kia with psychotic features





Plan:


-Patient continues to meet criteria for inpatient psychiatric admission for 

symptom stabilization and safety. Patient has not signed adult voluntary form 

and medication consent and was placed in patient's chart.


-Medications: Discontinue paliperidone by mouth or place with Seroquel 100 mg 

daily at bedtime for mood stabilization/psychosis/insomnia.  Continue on with 

Depakote 500 mg twice a day for mood stabilization.


-When necessary Ativan and Haldol for agitation/aggression.


-NRT -not needed as patient does not smoke


-SW on board for discharge planning.  Encouraged the patient to participate in 

milieu.  Patient has deferral today with her .  Likely discharge in 1-2 

days.

## 2022-07-13 RX ADMIN — DIVALPROEX SODIUM SCH MG: 500 TABLET, FILM COATED, EXTENDED RELEASE ORAL at 09:05

## 2022-07-13 RX ADMIN — DIVALPROEX SODIUM SCH MG: 500 TABLET, FILM COATED, EXTENDED RELEASE ORAL at 20:03

## 2022-07-13 NOTE — P.PN
Progress Note - Text


Progress Note Date: 07/13/22





Interval History:


Patient was seen wandering the hallways and was directable and agreeable to sp

silvano with writer in the office.  Patient continues to ramble at times, 

tangential/circumstantial in thought process.  She continues to demonstrate a 

fairly poor insight and judgment.  She is denying any changes in her mood and 

states that she feels "fine".  We spoke about her not deferring yesterday with 

the  however she was fairly focused on discharge today.  She claims that

she is going to some groups intending to participate.  She is not endorsing 

paranoia or delusions today.  She states that she was able to sleep better last 

night. At this time patient denies any suicidal or homical ideations, intent or 

plan. Patient denies any auditory, visual hallucinations and denies any paranoia

or delusions. Patient denies any side effects from the medications and has been 

compliant with meds. 





Mental Status Exam:


General Appearance: Patient appears to be dressed in home in street clothing, 

stated age is alert, directable. Patient appears to have improving hygiene and 

grooming.


Behavior: Patient is seated without any agitated behavior.  Her directable 

today.


Speech: Patient's speech is rapid/pressured.  Rambles, improving mildly


Mood/Affect: Patient reports their mood is "fine", affect is congruent


Suicidality/Homicidality:  Patient denies having any homicidal ideation intent 

or plan. Denies any suicidal ideations intent or plan  


Perceptions: Patient denies any visual hallucinations and denies any auditory 

hallucinations.


Though content/process: Tangential/circumstantial.  Rambling, flight of ideas, 

improving


Memory and concentration: AOX3, grossly intact for the purposes of this session


Judgment and insight: poor, improving mildly.





IMPRESSIONS: 


Bipolar disorder, current episode kia with psychotic features





Plan:


-Patient continues to meet criteria for inpatient psychiatric admission for 

symptom stabilization and safety. Patient has not signed adult voluntary form 

and medication consent and was placed in patient's chart.


-Medications: Seroquel 100 mg daily at bedtime for mood 

stabilization/psychosis/insomnia. Continue on with Depakote 500 mg twice a day 

for mood stabilization.


-When necessary Ativan and Haldol for agitation/aggression.


-NRT -not needed as patient does not smoke


-SW on board for discharge planning.  Encouraged the patient to participate in 

milieu. Patient did not defer with  and awaiting court date on 7/27.

## 2022-07-14 RX ADMIN — DIVALPROEX SODIUM SCH MG: 500 TABLET, FILM COATED, EXTENDED RELEASE ORAL at 21:12

## 2022-07-14 RX ADMIN — DIVALPROEX SODIUM SCH MG: 500 TABLET, FILM COATED, EXTENDED RELEASE ORAL at 08:57

## 2022-07-14 NOTE — P.PN
Progress Note - Text


Progress Note Date: 07/14/22





Interval History:


Patient was seen wandering the hallways and was directable and agreeable to antonino schaefer with writer in the office.  Patient continues to ramble at times, 

tangential/circumstantial in thought process, mildly improving.  She continues 

to demonstrate a fairly poor insight and judgment which appears to be chronic.  

she claims that her mood is okay at this time and is denying any depression or 

anxiety.  She asked several questions about the court process and the 

involvement of the drugs today.  She appears to be difficult to interrupt and 

redirect during conversation.  She states that she slept fairly well last night.

She is not endorsing paranoia or delusions today. At this time patient denies 

any suicidal or homical ideations, intent or plan. Patient denies any auditory, 

visual hallucinations and denies any paranoia or delusions. Patient denies any 

side effects from the medications and has been compliant with meds. 





Mental Status Exam:


General Appearance: Patient appears to be dressed in home in street clothing, 

stated age is alert, directable. Patient appears to have improving hygiene and 

grooming


Behavior: Patient is seated without any agitated behavior. more directable 

today.


Speech: Patient's speech is rapid/pressured. Rambles, improving mildly


Mood/Affect: Patient reports their mood is "ok", affect is congruent


Suicidality/Homicidality:  Patient denies having any homicidal ideation intent 

or plan. Denies any suicidal ideations intent or plan  


Perceptions: Patient denies any visual hallucinations and denies any auditory 

hallucinations.


Though content/process: Tangential/circumstantial. Rambling, flight of ideas, 

improving


Memory and concentration: AOX3, grossly intact for the purposes of this session


Judgment and insight: chronically poor, improving mildly.





IMPRESSIONS: 


schizoaffective disorder, bipolar type





Plan:


-Patient continues to meet criteria for inpatient psychiatric admission for 

symptom stabilization and safety. Patient has not signed adult voluntary form 

and medication consent and was placed in patient's chart.


-Medications: increase Seroquel 150 mg daily at bedtime for mood 

stabilization/psychosis/insomnia. Continue on with Depakote 500 mg twice a day 

for mood stabilization.


-check depakote level tomorrow morning.


-When necessary Ativan and Haldol for agitation/aggression.


-NRT -not needed as patient does not smoke


-SW on board for discharge planning.  Encouraged the patient to participate in 

milieu. Patient did not defer with  and awaiting court date on 7/27.

## 2022-07-15 VITALS — RESPIRATION RATE: 16 BRPM

## 2022-07-15 RX ADMIN — DIVALPROEX SODIUM SCH MG: 500 TABLET, FILM COATED, EXTENDED RELEASE ORAL at 08:44

## 2022-07-15 RX ADMIN — DIVALPROEX SODIUM SCH: 500 TABLET, FILM COATED, EXTENDED RELEASE ORAL at 09:43

## 2022-07-15 NOTE — P.PN
Progress Note - Text


Progress Note Date: 07/15/22





Interval History:


Patient was seen wandering the hallways and was directable and agreeable to antonino schaefer with writer in the office.  Patient continues to ramble at times, 

tangential/circumstantial in thought process, mildly improving.  She is still 

difficult to interrupt and redirect during conversation.  She made some 

illogical statements during conversation and spoke about her marriage.  She 

claims that she did not take the Depakote this morning however did not give a 

clear reason why.  She claims that she would prefer to take the Depakote at 

nighttime at 750 mg dose.  She was agreeable to have her Seroquel increased.  

She states that she is going to some groups. She states that she slept fairly 

well last night. She is not endorsing paranoia or delusions today. At this time 

patient denies any suicidal or homical ideations, intent or plan. Patient denies

any auditory, visual hallucinations and denies any paranoia or delusions. 

Patient denies any side effects from the medications and has been compliant with

meds. 





Mental Status Exam:


General Appearance: Patient appears to be dressed in home in street clothing, 

stated age is alert, directable. Patient appears to have improving hygiene and 

grooming


Behavior: Patient is seated without any agitated behavior. more directable 

today.


Speech: Patient's speech is rapid/pressured. Rambles, improving mildly


Mood/Affect: Patient reports their mood is "alright", affect is congruent


Suicidality/Homicidality:  Patient denies having any homicidal ideation intent 

or plan. Denies any suicidal ideations intent or plan  


Perceptions: Patient denies any visual hallucinations and denies any auditory 

hallucinations.


Though content/process: Tangential/circumstantial. Rambling, flight of ideas, 

improving


Memory and concentration: AOX3, grossly intact for the purposes of this session


Judgment and insight: chronically poor, improving mildly.





IMPRESSIONS: 


schizoaffective disorder, bipolar type





Plan:


-Patient continues to meet criteria for inpatient psychiatric admission for 

symptom stabilization and safety. Patient has not signed adult voluntary form 

and medication consent and was placed in patient's chart.


-Medications: increase Seroquel 200 mg daily at bedtime for mood 

stabilization/psychosis/insomnia.  As per patient's request, will change 

Depakote ER to 750 mg daily at bedtime for mood stabilization. 


-When necessary Ativan and Haldol for agitation/aggression.


-NRT -not needed as patient does not smoke


-SW on board for discharge planning.  Encouraged the patient to participate in 

milieu. Patient did not defer with  and awaiting court date on 7/27. 

patient signed waive and stip. likely discharge monday if patient continues to 

improve over the weekend.

## 2022-07-16 RX ADMIN — DIVALPROEX SODIUM SCH MG: 500 TABLET, FILM COATED, EXTENDED RELEASE ORAL at 20:49

## 2022-07-16 NOTE — P.PN
Progress Note - Text


Progress Note Date: 07/16/22


Interval History:


Patient knocked on the psychiatrist's office door and asked to be seen. She she

ears intrusive and irritable, can be difficult to redirect at times. Her thought

process consists of loose associations, tangential/circumstantial, and thought 

content is laced with paranoid delusions. She rambles about moving out of state 

([place] or [place])...."with my government experience, doesn't get data, news

can only give limited information, has been limited over the past 3 years, has 

become homicidal in her apartment, not performing it...I would never do that, 

take out an innocent bystander, deep down gut feeling, strive for independence, 

I feel like family is getting sucked down the situation I'm in ....I'm in the 

unknown too, so moving to a different state where they could take me in as an 

individual, because I'm mid-party, not republic or Libertarian, ...." 


Birthday cards, gifts will not be sent, totally abused and fraudulant, [employer]

life in the theo [place]tha was even hacked, but they call me paranoid. At this time 

patient denies any active suicidal or homicidal ideations, intent or plan. 

Patient denies any auditory, visual hallucinations. Patient denies any side 

effects from the medications, but argues about her medications, does not want to

take medications and wants to take the lowest dose possible despite being still 

showing symptoms of kia and psychosis. 





Mental Status Exam:


General Appearance: Patient appears to be dressed in home in street clothing. 

Patient appears to have improving hygiene and grooming


Behavior: Patient is seated without any agitated behavior, is irritable and 

difficult to redirect. 


Speech: Patient's speech is rapid/pressured. Rambles.


Mood/Affect: Patient reports their mood is "alright", affect is congruent


Suicidality/Homicidality:  Patient denies having any homicidal ideation intent 

or plan. Denies any suicidal ideations intent or plan  


Perceptions: Patient denies any visual hallucinations and denies any auditory 

hallucinations.


Though content/process: Tangential/circumstantial. Rambling, flight of ideas, 

loose associations. 


Memory and concentration: AOX3, grossly intact for the purposes of this session


Judgment and insight: chronically poor, improving mildly.





IMPRESSIONS: 


Schizoaffective disorder, bipolar type





Plan:


-Patient continues to meet criteria for inpatient psychiatric admission for 

symptom stabilization and safety. Patient has not signed adult voluntary form 

and medication consent and was placed in patient's chart.


-Medications: 


Increase Seroquel to 300 mg QHS for mood stabilization/psychosis/insomnia. 


Increase Depakote ER to 1000 mg QHS for mood stabilization. 


-When necessary Ativan and Haldol for agitation/aggression.


-NRT -not needed as patient does not smoke


-SW on board for discharge planning.  Encouraged the patient to participate in 

milieu. Patient did not defer with  and awaiting court date on 7/27. 

patient signed waive and stip. likely discharge monday if patient continues to 

improve over the weekend.

## 2022-07-17 RX ADMIN — DIVALPROEX SODIUM SCH MG: 500 TABLET, FILM COATED, EXTENDED RELEASE ORAL at 21:26

## 2022-07-17 NOTE — P.PN
Progress Note - Text


Progress Note Date: 07/17/22


Interval History:


Patient was found in her room, drinking her water. She is dismissive and condes

cending on assessment. Her thought process consists of loose associations, with 

pressured speech. She states her Mood is "fine", and that her day has been 

"restful, perfect". She appears to minimize her mood symptoms in order to hasten

discharge. Staff reports she continues to display manic symptoms, pressured 

speech, rambling, intrusive, talking about "a long lost brother" that she needs 

to find. She has been withdrawn and isolating to her room today, is focused on 

discharge and states she is "anxious" to talk to the  tomorrow to 

get herself set up with [facility]. Her insight and judgment remain poor. She has been 

requesting discharge from the nurses multiple times today. She is partially 

compliant with medications, took only 100 mg of her 300 mg dose of Seroquel last

night, and took only 500 mg of her 1000 mg dose of Depakote last night. 





Mental Status Exam:


General Appearance: Patient appears to be stated age, dressed in casual attire.


Behavior: Patient is seated without any agitated behavior, is irritable and 

dismissive. 


Speech: Patient's speech is rapid but less pressured. 


Mood/Affect: Patient reports their mood is "restful, perfect", affect is 

congruent


Suicidality/Homicidality: Patient denies having any homicidal ideation intent or

plan. Denies any suicidal ideations intent or plan  


Perceptions: Patient denies any visual hallucinations and denies any auditory 

hallucinations.


Though content/process: Rambling, dismissive. 


Memory and concentration: AOX3, grossly intact for the purposes of this session


Judgment and insight: chronically poor.





IMPRESSIONS: 


Schizoaffective disorder, bipolar type





Plan:


-Patient continues to meet criteria for inpatient psychiatric admission for 

symptom stabilization and safety. Patient has not signed adult voluntary form an

d medication consent and was placed in patient's chart.


-Medications: 


Continue Seroquel 300 mg QHS for mood stabilization/psychosis/insomnia. 


Continue Depakote ER 1000 mg QHS for mood stabilization. 


Encourage medication compliance. 


-When necessary Ativan and Haldol for agitation/aggression.


-NRT -not needed as patient does not smoke


-SW on board for discharge planning.  Encouraged the patient to participate in 

milieu. Patient did not defer with  and awaiting court date on 7/27. 

patient signed waive and stip. likely discharge monday if patient continues to 

improve over the weekend.

## 2022-07-18 RX ADMIN — DIVALPROEX SODIUM SCH MG: 500 TABLET, FILM COATED, EXTENDED RELEASE ORAL at 20:46

## 2022-07-18 NOTE — P.PN
Progress Note - Text


Progress Note Date: 07/18/22





Interval History:


Patient was seen wandering the hallways and was directable and agreeable to antonino schaefer with writer in the office. Patient continues to ramble at times, 

tangential/circumstantial in thought process however this is improving 

significantly.  She is more oriented and easier to direct during conversation.  

She is also more appropriate in her answers.  She claims that she feels she is 

almost ready for discharge and does not know where she is going to go.  She 

claims that she did try to speak with her sister over the phone however was 

fairly vague about what they talked about.  She claims that her mood and anxiety

of an improving. She states that she is going to some groups. She states that 

she slept fairly well last night. She is not endorsing paranoia or delusions 

today. At this time patient denies any suicidal or homical ideations, intent or 

plan. Patient denies any auditory, visual hallucinations and denies any paranoia

or delusions. Patient denies any side effects from the medications and has been 

compliant with meds. 





Mental Status Exam:


General Appearance: Patient appears to be dressed in home in street clothing, 

stated age is alert, directable. Patient appears to have improving hygiene and 

grooming


Behavior: Patient is seated without any agitated behavior. more directable 

today.


Speech: Patient's speech is rapid/pressured. Rambles, improving mildly


Mood/Affect: Patient reports their mood is "ok", affect is congruent


Suicidality/Homicidality:  Patient denies having any homicidal ideation intent 

or plan. Denies any suicidal ideations intent or plan  


Perceptions: Patient denies any visual hallucinations and denies any auditory 

hallucinations.


Though content/process: Tangential/circumstantial. Rambling, improving


Memory and concentration: AOX3, grossly intact for the purposes of this session


Judgment and insight: chronically poor, improving mildly.





IMPRESSIONS: 


schizoaffective disorder, bipolar type





Plan:


-Patient continues to meet criteria for inpatient psychiatric admission for 

symptom stabilization and safety. Patient has not signed adult voluntary form 

and medication consent and was placed in patient's chart.


-Medications: Seroquel 300 mg daily at bedtime for mood 

stabilization/psychosis/insomnia.  Depakote ER 1000 mg daily at bedtime for mood

stabilization. 


-When necessary Ativan and Haldol for agitation/aggression.


-NRT -not needed as patient does not smoke


-SW on board for discharge planning.  Encouraged the patient to participate in 

milieu. patient signed waive and stip and is now on a treatment order. likely 

discharge tomorrow, patient will be visited by and enrolled in [facility] program

through [facility] and also will be staying in a motel upon discharge.

## 2022-07-19 VITALS — HEART RATE: 65 BPM | TEMPERATURE: 98 F | SYSTOLIC BLOOD PRESSURE: 116 MMHG | DIASTOLIC BLOOD PRESSURE: 60 MMHG

## 2022-07-19 NOTE — P.DS
Providers


Date of admission: 


07/07/22 14:22





Expected date of discharge: 07/19/22


Attending physician: 


Ciro Busby MD





Consults: 





                                        





07/07/22 14:24


Consult Physician Routine 


   Consulting Provider: Jose L Block


   Consult Reason/Comments: Medical H&P


   Do you want consulting provider notified?: Yes











Primary care physician: 


Clare Alvarez








- Discharge Diagnosis(es)


(1) Schizoaffective disorder, bipolar type


Current Visit: Yes   Status: Acute   Priority: High   


Hospital Course: 





Admission HPI:


Admission note was completed by writer "Patient is a 57-year-old  

female currently , homeless and has one son. Patient presented to the 

hospital yesterday with police escort for psychiatric evaluation.  Patient 

apparently was petitioned by her family.  Patient was seen to be disorganized, 

pressured in speech and also bizarre.  Patient was admitted involuntarily to the

mental health unit.  Patient's UDS was negative.  Patient was seen today by 

writer in the office and agreeable to speak.  Patient had pressured speech, 

rambles flight of ideas.  She was tangential/circumstantial.  She spoke about 

trying to get to a shelter and claims that she is "in between houses".  She 

states that she is having financial difficulties and was not able to pay her 

rent.  She states that she was walking down the side of the road with her bags 

in her hand and was pulled over by the police and taken to the hospital.  She 

states that she does not know why.  She has fairly poor insight into her 

condition.  She does state that she has a history of bipolar disorder however 

took herself off her medication states that "I don't think I need them".  She is

grandiose and claims that she is "high strung".  She is denying any depression 

today.  Denying any anxiety.  She does endorse some paranoia.  She also is 

endorsing auditory hallucinations coming through the "baseboards in the wall".  

She states her sleep has been fair appetite as been fair. Patient denies any 

suicidal or homicidal ideations intent or plan.  At this time patient denies any

visual hallucinations. Patient admits to using cigarettes daily."





Hospital course:


Upon admission to the unit patient was admitted involuntarily on a petition and 

certificate and a second certificate was completed and faxed with the courts.  

Patient ended up not deferring with her  however was taking medications 

and then later on agreeable to sign the waive and stip. Patient got along well 

with other patients on the unit and followed unit protocol.  Patient was 

compliant with the medications and denied any side effects throughout hospital 

course.  Patient was started on Seroquel and increased to a dose of 300 mg daily

at bedtime for mood stabilization/insomnia.  Patient was also started on 

Depakote ER 1000 mg daily at bedtime for mood stabilization.  Patient spoke of 

her stressors and engaged in therapy both group and individual.  Patient was 

also seen by medical team for history and physical exam.  Throughout the course 

of the hospitalization patient gradually improved with regards to mood, anxiety,

sleep and became more future oriented with improved insight and judgment. On the

day of discharge patient denied any suicidal or homicidal ideations intent or 

plan denied any auditory or visual hallucinations. Patient endorsed wanting to 

live for her future and to find housing.  The patient denied any access to guns 

or weapons.  Patient denied any paranoia and did not endorse any delusions.  

Patient does not have a significant history of substance abuse and was counseled

on abstaining from all substances including alcohol and marijuana.   Patient was

also counseled on the medications and need for regular compliance and was 

encouraged to follow-up with their outpatient appointment for mental health and 

also for primary care.  Prior to discharge a family meeting will be arranged by 

 to answer any questions and ensure safety upon discharge.  

Patient's guardian/sister is going to pay for patient to stay in a motel until a

more permanent housing situation arises.





Mental status exam:


General Appearance: Patient appears to be stated age is alert, pleasant, and 

cooperative. Patient is in no acute distress and has improved hygiene and 

grooming 


Behavior: Patient is calmly seated without any agitated behavior.


Speech: Patient's speech is fluent and nonpressured.  Rambling at times.


Mood/Affect: Patient reports their mood is "good", affect is congruent and 

euthymic. 


Suicidality/Homicidality:  Patient denies having any suicidal or homicidal 

ideation intent or plan.  


Perceptions: Patient denies any auditory or visual hallucinations.  


Though content/process: There is no evidence of any delusional thought content 

and thought process is linear and goal-directed. more future oriented


Memory and concentration: AOX3, grossly intact for the purposes of this session.

Can spell "WORLD" backwards correctly.


Judgment and insight: chronically poor, however has improved with guarded 

prognosis





Impression:


Schizoaffective disorder, bipolar type





Plan:


-Continue with discharge today as patient has improved and stabilized 

psychiatrically and is not currently an imminent threat to herself and/or 

others. 


-Continue medications: Depakote ER 1000 mg daily at bedtime for mood 

stabilization, Seroquel 300 mg daily at bedtime for mood stabilization/insomnia.


-Patient was counseled on the need for medication compliance and appropriate 

follow-up at mental health and also primary care for medical issues.  Patient 

verbalized understanding and agreed.


-Social work to arrange for and conduct family meeting to ensure safety upon 

discharge and answer any questions/concerns. Social work also to arrange for 

patients follow up appointments with [facility] for psychiatric care along with follow 

up with primary care provider.


-Patient counseled on abstaining from recreational drugs and marijuana and 

alcohol. Was informed/educated on the adverse effects on their physical and 

mental health. Patient verbally agreed and understood. 


-Patient was instructed to return to the hospital or seek immediate medical care

if their psychiatric or medical symptoms do worsen or reoccur.








                                    Allergies











Allergy/AdvReac Type Severity Reaction Status Date / Time


 


No Known Allergies Allergy   Verified 07/07/22 12:37











                               Laboratory Results











WBC  10.6 k/uL (3.8-10.6)   07/08/22  10:27    


 


RBC  4.52 m/uL (3.80-5.40)   07/08/22  10:27    


 


Hgb  13.7 gm/dL (11.4-16.0)   07/08/22  10:27    


 


Hct  41.2 % (34.0-46.0)   07/08/22  10:27    


 


MCV  91.1 fL (80.0-100.0)   07/08/22  10:27    


 


MCH  30.3 pg (25.0-35.0)   07/08/22  10:27    


 


MCHC  33.3 g/dL (31.0-37.0)   07/08/22  10:27    


 


RDW  12.1 % (11.5-15.5)   07/08/22  10:27    


 


Plt Count  243 k/uL (150-450)   07/08/22  10:27    


 


MPV  8.4   07/08/22  10:27    


 


Neutrophils %  68 %  07/08/22  10:27    


 


Lymphocytes %  25 %  07/08/22  10:27    


 


Monocytes %  5 %  07/08/22  10:27    


 


Eosinophils %  1 %  07/08/22  10:27    


 


Basophils %  1 %  07/08/22  10:27    


 


Neutrophils #  7.2 k/uL (1.3-7.7)   07/08/22  10:27    


 


Lymphocytes #  2.6 k/uL (1.0-4.8)   07/08/22  10:27    


 


Monocytes #  0.5 k/uL (0-1.0)   07/08/22  10:27    


 


Eosinophils #  0.1 k/uL (0-0.7)   07/08/22  10:27    


 


Basophils #  0.1 k/uL (0-0.2)   07/08/22  10:27    


 


Sodium  137 mmol/L (137-145)   07/08/22  10:27    


 


Potassium  4.3 mmol/L (3.5-5.1)   07/08/22  10:27    


 


Chloride  104 mmol/L ()   07/08/22  10:27    


 


Carbon Dioxide  24 mmol/L (22-30)   07/08/22  10:27    


 


Anion Gap  9 mmol/L  07/08/22  10:27    


 


BUN  14 mg/dL (7-17)   07/08/22  10:27    


 


Creatinine  0.57 mg/dL (0.52-1.04)   07/08/22  10:27    


 


Est GFR (CKD-EPI)AfAm  >90  (>60 ml/min/1.73 sqM)   07/08/22  10:27    


 


Est GFR (CKD-EPI)NonAf  >90  (>60 ml/min/1.73 sqM)   07/08/22  10:27    


 


Glucose  99 mg/dL (74-99)   07/08/22  10:27    


 


Estimated Ave Glu mg/dL  124   07/08/22  10:27    


 


Hemoglobin A1c  6.0 % (0.0-6.0)   07/08/22  10:27    


 


Calcium  9.0 mg/dL (8.4-10.2)   07/08/22  10:27    


 


Total Bilirubin  0.5 mg/dL (0.2-1.3)   07/08/22  10:27    


 


Conjugated Bilirubin  0.0 mg/dL (0.0-0.3)   07/08/22  10:27    


 


Unconjugated Bilirubin  0.4 mg/dL (0.0-1.1)   07/08/22  10:27    


 


Delta Bilirubin  0.1 mg/dL (0.0-0.2)   07/08/22  10:27    


 


AST  24 U/L (14-36)   07/08/22  10:27    


 


ALT  18 U/L (4-34)   07/08/22  10:27    


 


Alkaline Phosphatase  85 U/L ()   07/08/22  10:27    


 


Total Protein  7.2 g/dL (6.3-8.2)   07/08/22  10:27    


 


Albumin  4.5 g/dL (3.5-5.0)   07/08/22  10:27    


 


Triglycerides  133.00 mg/dL (0..00)   07/08/22  10:27    


 


Cholesterol  209.00 mg/dL (0..00)  H  07/08/22  10:27    


 


LDL Cholesterol, Calc  124.9 mg/dL (0.0-131.0)   07/08/22  10:27    


 


VLDL Cholesterol, Calc  26.60 mg/dL (5.00-40.00)   07/08/22  10:27    


 


HDL Cholesterol  57.50 mg/dL (40.00-60.00)   07/08/22  10:27    


 


Cholesterol/HDL Ratio  3.63 Ratio  07/08/22  10:27    


 


TSH  1.820 mIU/L (0.465-4.680)   07/08/22  10:27    


 


Urine Color  Yellow   07/07/22  08:20    


 


Urine Appearance  Cloudy  (Clear)  H  07/07/22  08:20    


 


Urine pH  6.5  (5.0-8.0)   07/07/22  08:20    


 


Ur Specific Gravity  1.025  (1.001-1.035)   07/07/22  08:20    


 


Urine Protein  Trace  (Negative)  H  07/07/22  08:20    


 


Urine Glucose (UA)  Negative  (Negative)   07/07/22  08:20    


 


Urine Ketones  Negative  (Negative)   07/07/22  08:20    


 


Urine Blood  Negative  (Negative)   07/07/22  08:20    


 


Urine Nitrite  Negative  (Negative)   07/07/22  08:20    


 


Urine Bilirubin  Negative  (Negative)   07/07/22  08:20    


 


Urine Urobilinogen  2.0 mg/dL (<2.0)   07/07/22  08:20    


 


Ur Leukocyte Esterase  Negative  (Negative)   07/07/22  08:20    


 


Urine RBC  1 /hpf (0-5)   07/07/22  08:20    


 


Urine WBC  7 /hpf (0-5)  H  07/07/22  08:20    


 


Ur Squamous Epith Cells  2 /hpf (0-4)   07/07/22  08:20    


 


Urine Bacteria  Occasional /hpf (None)  H  07/07/22  08:20    


 


Urine Mucus  Few /hpf (None)  H  07/07/22  08:20    


 


Urine HCG, Qual  Not Detected  (Not Detectd)   07/07/22  08:20    


 


Urine Opiates Screen  Not Detected  (NotDetected)   07/07/22  08:20    


 


Ur Oxycodone Screen  Not Detected  (NotDetected)   07/07/22  08:20    


 


Urine Methadone Screen  Not Detected  (NotDetected)   07/07/22  08:20    


 


Ur Propoxyphene Screen  Not Detected  (NotDetected)   07/07/22  08:20    


 


Ur Barbiturates Screen  Not Detected  (NotDetected)   07/07/22  08:20    


 


U Tricyclic Antidepress  Not Detected  (NotDetected)   07/07/22  08:20    


 


Ur Phencyclidine Scrn  Not Detected  (NotDetected)   07/07/22  08:20    


 


Ur Amphetamines Screen  Not Detected  (NotDetected)   07/07/22  08:20    


 


U Methamphetamines Scrn  Not Detected  (NotDetected)   07/07/22  08:20    


 


U Benzodiazepines Scrn  Not Detected  (NotDetected)   07/07/22  08:20    


 


Urine Cocaine Screen  Not Detected  (NotDetected)   07/07/22  08:20    


 


U Marijuana (THC) Screen  Not Detected  (NotDetected)   07/07/22  08:20    


 


Coronavirus (PCR)  Not Detected  (Not Detectd)   07/07/22  13:10    











                                   Vital Signs











Temp  98.0 F   07/19/22 06:35


 


Pulse  65   07/19/22 06:35


 


Resp  16   07/19/22 06:35


 


BP  116/60   07/19/22 06:35


 


Pulse Ox  95   07/19/22 06:35


 


FiO2      











Patient Condition at Discharge: Stable





Plan - Discharge Summary


Discharge Rx Participant: Yes


New Discharge Prescriptions: 


New


   Divalproex ER [Depakote ER] 1,000 mg PO HS 30 Days  tab


   QUEtiapine [SEROquel] 300 mg PO HS 30 Days  tab


Discharge Medication List





Divalproex ER [Depakote ER] 1,000 mg PO HS 30 Days  tab 07/19/22 [Rx]


QUEtiapine [SEROquel] 300 mg PO HS 30 Days  tab 07/19/22 [Rx]








Follow up Appointment(s)/Referral(s): 


Referral, Referral [Other] - 07/19/22 4:00 pm


Clare Alvarez MD [Primary Care Provider] - 1-2 days


Activity/Diet/Wound Care/Special Instructions: 


Avoid the use of street drugs and alcohol.  Take all prescriptions as 

prescribed.  When you are in need of refills on your medications, please contact

your medical provider and/or outpatient psychiatrist to have this done.  Please 

go to scheduled outpatient appointment for aftercare treatment.  If symptoms 

return or become worse, call the crisis line at [phone] and/or go to the 

nearest emergency room for evaluation. 


Discharge Disposition: OTHER INSTITUTION NOT DEFINED

## 2022-08-01 ENCOUNTER — HOSPITAL ENCOUNTER (INPATIENT)
Dept: HOSPITAL 47 - EC | Age: 57
LOS: 9 days | Discharge: HOME | DRG: 885 | End: 2022-08-10
Attending: PSYCHIATRY & NEUROLOGY | Admitting: PSYCHIATRY & NEUROLOGY
Payer: MEDICARE

## 2022-08-01 DIAGNOSIS — Z79.899: ICD-10-CM

## 2022-08-01 DIAGNOSIS — R13.10: ICD-10-CM

## 2022-08-01 DIAGNOSIS — Z91.19: ICD-10-CM

## 2022-08-01 DIAGNOSIS — Z20.822: ICD-10-CM

## 2022-08-01 DIAGNOSIS — Z71.6: ICD-10-CM

## 2022-08-01 DIAGNOSIS — F25.0: Primary | ICD-10-CM

## 2022-08-01 DIAGNOSIS — Z59.00: ICD-10-CM

## 2022-08-01 DIAGNOSIS — Z71.41: ICD-10-CM

## 2022-08-01 DIAGNOSIS — F17.210: ICD-10-CM

## 2022-08-01 DIAGNOSIS — F41.9: ICD-10-CM

## 2022-08-01 DIAGNOSIS — Z71.51: ICD-10-CM

## 2022-08-01 PROCEDURE — 80164 ASSAY DIPROPYLACETIC ACD TOT: CPT

## 2022-08-01 PROCEDURE — 99285 EMERGENCY DEPT VISIT HI MDM: CPT

## 2022-08-01 PROCEDURE — 80306 DRUG TEST PRSMV INSTRMNT: CPT

## 2022-08-01 PROCEDURE — 82075 ASSAY OF BREATH ETHANOL: CPT

## 2022-08-01 PROCEDURE — 87635 SARS-COV-2 COVID-19 AMP PRB: CPT

## 2022-08-01 SDOH — ECONOMIC STABILITY - HOUSING INSECURITY: HOMELESSNESS UNSPECIFIED: Z59.00

## 2022-08-01 NOTE — ED
General Adult HPI





- General


Chief complaint: Psychiatric Symptoms


Stated complaint: mental health


Time Seen by Provider: 08/01/22 12:30


Source: patient, police, RN notes reviewed, old records reviewed


Mode of arrival: ambulatory


Limitations: no limitations





- History of Present Illness


Initial comments: 





This is a 57-year-old female presents emergency Department under a court order. 

Patient refuses to cooperate.  Patient states she is not going to talk about any

of her mental health history and most of the psychiatrist.  Patient will not 

answer any of my questions relative to mental health.  She did state she has no 

physical complaints today.  Patient denies any fever chills or cough.  Patient 

denies any injuries patient denies any pain or difficulty breathing.





- Related Data


                                  Previous Rx's











 Medication  Instructions  Recorded


 


Divalproex ER [Depakote ER] 1,000 mg PO HS 30 Days  tab 07/19/22


 


QUEtiapine [SEROquel] 300 mg PO HS 30 Days  tab 07/19/22











                                    Allergies











Allergy/AdvReac Type Severity Reaction Status Date / Time


 


No Known Allergies Allergy   Verified 08/01/22 13:45














Review of Systems


ROS Statement: 


Those systems with pertinent positive or pertinent negative responses have been 

documented in the HPI.





ROS Other: All systems not noted in ROS Statement are negative.





Past Medical History


Past Medical History: No Reported History


History of Any Multi-Drug Resistant Organisms: None Reported


Past Surgical History: No Surgical Hx Reported


Past Anesthesia/Blood Transfusion Reactions: No Reported Reaction


Past Psychological History: Anxiety


Smoking Status: Current every day smoker


Past Alcohol Use History: None Reported, Occasional


Past Drug Use History: None Reported





General Exam





- General Exam Comments


Initial Comments: 





GENERAL:


Patient is well-developed and well-nourished.  Patient is nontoxic and well-

hydrated and is in no acute distress.





ENT:


Neck is soft and supple.  No significant lymphadenopathy is noted.  Oropharynx 

is clear.  Moist mucous membranes.  Neck has full range of motion without 

eliciting any pain.  





EYES:


The sclera were anicteric and conjunctiva were pink and moist.  Extraocular 

movements were intact and pupils were equal round and reactive to light.  

Eyelids were unremarkable.





PULMONARY:


Unlabored respirations.  Good breath sounds bilaterally.  No audible rales 

rhonchi or wheezing was noted.





CARDIOVASCULAR:


There is a regular rate and rhythm without any murmurs gallops or rubs. 





ABDOMEN:


Soft and nontender with normal bowel sounds. 





SKIN:


Skin is clear with no lesions or rashes and otherwise unremarkable.





NEUROLOGIC:


Patient is alert and oriented x3.  Cranial nerves II through XII are grossly 

intact.  Motor and sensory are also intact.  Normal speech, volume and content. 

 Symmetrical smile.  





MUSCULOSKELETAL:


Normal extremities with adequate strength and full range of motion.  





LYMPHATICS:


No significant lymphadenopathy is noted





PSYCHIATRIC:


Patient refuses to answer any of my questions but she is somewhat hyperverbal 

and has told me she is not going to cooperate with me.  Patient also states that

 everybody is lying about her.


Limitations: no limitations





Course


                                   Vital Signs











  08/01/22





  12:26


 


Temperature 98.8 F


 


Pulse Rate 116 H


 


Respiratory 18





Rate 


 


Blood Pressure 160/104


 


O2 Sat by Pulse 95





Oximetry 














Medical Decision Making





- Lab Data


                                   Lab Results











  08/01/22 Range/Units





  13:06 


 


Urine Opiates Screen  Not Detected  (NotDetected)  


 


Ur Oxycodone Screen  Not Detected  (NotDetected)  


 


Urine Methadone Screen  Not Detected  (NotDetected)  


 


Ur Propoxyphene Screen  Not Detected  (NotDetected)  


 


Ur Barbiturates Screen  Not Detected  (NotDetected)  


 


U Tricyclic Antidepress  Not Detected  (NotDetected)  


 


Ur Phencyclidine Scrn  Not Detected  (NotDetected)  


 


Ur Amphetamines Screen  Not Detected  (NotDetected)  


 


U Methamphetamines Scrn  Not Detected  (NotDetected)  


 


U Benzodiazepines Scrn  Not Detected  (NotDetected)  


 


Urine Cocaine Screen  Not Detected  (NotDetected)  


 


U Marijuana (THC) Screen  Not Detected  (NotDetected)  














Disposition


Clinical Impression: 


 Acute psychosis, Medical non-compliance





Disposition: ADMITTED AS IP TO THIS HOSP


Referrals: 


Clare Alvarez MD [Primary Care Provider] - 1-2 days


Time of Disposition: 17:46

## 2022-08-02 NOTE — P.HP
Psychiatric H&P





- .


H&P Date: 08/02/22


History & Physical: 


                                    Allergies











Allergy/AdvReac Type Severity Reaction Status Date / Time


 


No Known Allergies Allergy   Verified 08/01/22 13:45








                                   Vital Signs











Temp  97.8 F   08/02/22 00:06


 


Pulse  91   08/02/22 00:06


 


Resp  18   08/02/22 00:06


 


BP  128/84   08/02/22 00:06


 


Pulse Ox  96   08/02/22 00:06


 


FiO2      








                                 Intake & Output











 08/01/22 08/02/22 08/02/22





 18:59 06:59 18:59


 


Weight 83.915 kg 72.178 kg 








                             Laboratory Last Values











Urine Opiates Screen  Not Detected  (NotDetected)   08/01/22  13:06    


 


Ur Oxycodone Screen  Not Detected  (NotDetected)   08/01/22  13:06    


 


Urine Methadone Screen  Not Detected  (NotDetected)   08/01/22  13:06    


 


Ur Propoxyphene Screen  Not Detected  (NotDetected)   08/01/22  13:06    


 


Ur Barbiturates Screen  Not Detected  (NotDetected)   08/01/22  13:06    


 


U Tricyclic Antidepress  Not Detected  (NotDetected)   08/01/22  13:06    


 


Ur Phencyclidine Scrn  Not Detected  (NotDetected)   08/01/22  13:06    


 


Ur Amphetamines Screen  Not Detected  (NotDetected)   08/01/22  13:06    


 


U Methamphetamines Scrn  Not Detected  (NotDetected)   08/01/22  13:06    


 


U Benzodiazepines Scrn  Not Detected  (NotDetected)   08/01/22  13:06    


 


Urine Cocaine Screen  Not Detected  (NotDetected)   08/01/22  13:06    


 


U Marijuana (THC) Screen  Not Detected  (NotDetected)   08/01/22  13:06    


 


Coronavirus (PCR)  Not Detected  (Not Detectd)   08/01/22  19:50    











08/02/22 10:00


IDENTIFYING DATA: Patient is a 57-year-old  female currently ,

homeless and has one son.





HPI: Patient presented to the hospital yesterday on a pickup order currently on 

a mental health treatment in order.  Patient apparently was a poor historian and

uncooperative in the ER and did not believe that she was on a court order.  

Urine drug screen was negative.  Patient was admitted last to the mental health 

unit less than a month ago and was discharged on Depakote and Seroquel.  She 

signed a waive and stip at that time with her  and agreed to be on a o

rder.  Patient apparently did not take any medications once she left the 

hospital.  Today she was seen wandering the hallways and agreeable speech 

writer.  She was responding to internal stimuli.  She was rambling, tangential 

and has loose associations.  She had a flight of ideas.  She was bizarre in her 

speech and thought content.  She spoke negatively about her medication and 

states that "they made me sick" and also claims that she does not need them any 

longer.  Very poor insight and judgment.  Difficult to redirect during 

conversation.  Denies any auditory or visual hallucinations.  Denies any 

suicidal or homicidal ideations intent or plan.  Claims that she is smoking 

cigarettes only, denies any other recreational drug use.





PAST PSYCHIATRIC HISTORY: Patient states that she has a history of 

schizoaffective disorder. Patient denies being on any psychiatric medications 

and stopped taking her Depakote and Seroquel after her previous discharge from 

the mental health unit in July 2022. Patient denies any psychiatric outpatient 

follow-up with [facility]. Patient denies any history of suicide attempts in the past.





PMH:denies





ALLERGIES: as per EMR





CHEMICAL DEPENDENCY HISTORY: as per HPI





FAMILY PSYCHIATRIC/SUBSTANCE USE HISTORY:  denies





SOCIAL HISTORY: Patient was born and raised in [place].  She states that she 

completed high school and also her bachelor's degree.  She was unreliable 

historian of her social history.





MENTAL STATUS EXAM: 


General Appearance: Patient appears to be dressed in home in street clothing, 

stated age is alert, difficult to redirect. Patient appears to have poor hygiene

and grooming.


Behavior: Patient is seated without any agitated behavior.  Difficult to 

redirect.


Speech: Patient's speech is rapid/pressured.  Rambles.  Bizarre.


Mood/Affect: Patient reports their mood is "fine", affect is congruent and 

constricted. 


Suicidality/Homicidality:  Patient denies having any homicidal ideation intent 

or plan. Denies any suicidal ideations intent or plan  


Perceptions: Patient denies any visual hallucinations at [facility] auditory 

hallucinations.


Though content/process: Tangential/circumstantial.  Rambling, flight of ideas.  

Grandiosity.  It is our content.


Memory and concentration: AOX3, grossly intact for the purposes of this session.

Cannot spell "WORLD" backwards


Judgment and insight: poor





STRENGTHS/WEAKNESSES: strength is that patient is resilient. Weakness is that 

patient has poor judgment and is impulsive and noncompliant with treatment and 

follow-up.





INTELLECT: average





IMPRESSIONS: 


Schizoaffective disorder, bipolar type


nicotine dependence





PLAN: 


-Patient is admitted under involuntary statusas patient is currently on an NADINE 

to the MHU for stabilization of psychiatric symptoms and safety. 


-Medications : Depakote 1000 mg QHS for mood stabilization, will start risperdal

1 mg bid for psychosis and mood stabilization/psychosis as apparently patient 

did well on this in the past. PAtient will need to be transitioned onto BENSON to 

ensure compliance.


-Ativan and Haldol PRN for agitation/aggression


-Patient was informed of the risks, benefits and side effects of the medication 


-Internal Medicine consult to perform medical evaluation and physical.


-NRT - nicotine patch


-SW on board for discharge planning. Encourage patient to participate in groups 

to work on coping skills. currently on an NADINE. will need to started on a BENSON 

deniz locke d.c


08/02/22 10:15

## 2022-08-02 NOTE — P.CONS
History of Present Illness





- Reason for Consult


Medical clearance





- History of Present Illness


Patient is a 57-year-old female is acutely psychotic but pleasant and very tough

historian because of her tangentiality flight of ideas lack of train of thought.

 Pressured speech.  Patient states that she that she hurt her finger although 

doesn't appear to have any infection at this time.  Patient wanted me to check 

her pulse but it didn't let me examine.  Patient doesn't do any straight answers

regarding smoking use and alcohol use.











REVIEW OF SYSTEMS: 


Unable to obtain much of the review of systems although mostly negative














PHYSICAL EXAMINATION: 





GENERAL: The patient is alert and oriented x3, not in any acute distress. Well 

developed, well nourished. 


HEENT: Pupils are round and equally reacting to light. EOMI. No scleral icterus.

No conjunctival pallor. Normocephalic, atraumatic. No pharyngeal erythema. No 

thyromegaly. SKIN: No rashes.  Patient had a small heel ulcer from her injury in

the right index finger proximally.


Most of the physical exam is limited as patient is noncooperative  





Assessment and plan


-Acute psychosis patient is highly noncooperative further management as per 

psychiatry.  Patient appears to be medically stable at this time


-Tachycardia secondary to acute psychosis no further intervention at this time 

from medical perspective














Past Medical History


Past Medical History: No Reported History


History of Any Multi-Drug Resistant Organisms: None Reported


Past Surgical History: No Surgical Hx Reported


Past Anesthesia/Blood Transfusion Reactions: No Reported Reaction


Past Psychological History: Anxiety


Smoking Status: Current every day smoker


Past Alcohol Use History: None Reported, Occasional


Past Drug Use History: None Reported





Medications and Allergies


                                Home Medications











 Medication  Instructions  Recorded  Confirmed  Type


 


Divalproex ER [Depakote ER] 1,000 mg PO HS 30 Days  tab 07/19/22 08/01/22 Rx


 


QUEtiapine [SEROquel] 300 mg PO HS 30 Days  tab 07/19/22 08/01/22 Rx








                                    Allergies











Allergy/AdvReac Type Severity Reaction Status Date / Time


 


No Known Allergies Allergy   Verified 08/01/22 13:45














Physical Exam


Vitals: 


                                   Vital Signs











  Temp Pulse Pulse Resp BP BP Pulse Ox


 


 08/02/22 00:06  97.8 F   91  18   128/84  96


 


 08/01/22 12:26  98.8 F  116 H   18  160/104   95








                                Intake and Output











 08/01/22 08/02/22 08/02/22





 22:59 06:59 14:59


 


Other:   


 


  Weight  72.178 kg

## 2022-08-03 NOTE — P.PN
Progress Note - Text


Progress Note Date: 08/03/22





Interval History:


Patient was seen wandering the hallways and was directable and agreeable to antonino schaefer with writer in the office.  Patient continues to ramble at times.  Illogical

and disorganized.  She states that she is doing "fine" and continues to have 

very poor insight and judgment.  She continues to believe that she does not need

medications.  She was difficult to redirect during conversation and made several

bizarre statements.  She states that she slept fairly last night.  She has to ha

ve her Depakote switched to the liquid formulation as she had trouble swallowing

the pills.  Improving appetite.  She has been going to some groups.  At this 

time patient denies any suicidal or homical ideations, intent or plan. Patient 

denies any auditory, visual hallucinations and denies any paranoia or delusions.

Patient denies any side effects from the medications and has been compliant with

meds. 





Mental Status Exam:


General Appearance: Patient appears to be dressed in home in street clothing, 

stated age is alert, difficult to redirect. Patient appears to have improving 

hygiene and grooming.


Behavior: Patient is seated without any agitated behavior.  Difficult to 

redirect.


Speech: Patient's speech is rapid/pressured.  Rambles.  Bizarre.


Mood/Affect: Patient reports their mood is "fine", affect is congruent and 

constricted. 


Suicidality/Homicidality:  Patient denies having any homicidal ideation intent 

or plan. Denies any suicidal ideations intent or plan  


Perceptions: Patient denies any visual hallucinations at [facility] auditory 

hallucinations.


Though content/process: Tangential/circumstantial.  Rambling, flight of ideas.  

[place]iosity, improving mildly.


Memory and concentration: AOX3, grossly intact for the purposes of this session


Judgment and insight: poor





IMPRESSIONS: 


Schizoaffective disorder, bipolar type


nicotine dependence





Plan:


-Patient continues to meet criteria for inpatient psychiatric admission for 

symptom stabilization and safety. Patient has not signed medication consent and 

was placed in patient's chart.


-Medications: Switch Depakote to Depakene, 750 mg daily at bedtime for mood 

stabilization.  Increase Risperdal to 2 mg twice a day for psychosis/mood 

stabilization.  Plan will be to transition patient onto long-acting injection to

ensure compliance.


-When necessary Ativan and Haldol for agitation/aggression.


-NRT - nicotine patch


-SW on board for discharge planning.  Encouraged the patient to participate in 

milieu. currently on an NADINE. will need to started on a BENSON prior to d.c

## 2022-08-04 RX ADMIN — VALPROIC ACID SCH MG: 250 SOLUTION ORAL at 20:35

## 2022-08-04 NOTE — P.PN
Progress Note - Text


Progress Note Date: 08/04/22





Interval History:


Patient was seen wandering the hallways and was directable and agreeable to sp

abigailk with writer day.  Patient continues to ramble at times and was difficult to 

redirect.  She appears to have improvement in her thought process and is more 

logical today.  She was tangential/circumstantial.  She claims that she is doing

"okay" and states that she had more close dropped off yesterday.  She was not 

endorsing any delusions today and was not acting bizarre.  She asked more 

appropriate questions to writer about discharge and about her medications.  She 

states that she is tolerating them well at this time.  She states that she slept

fairly last night.  She claims that she is doing better with the Depakene liquid

formulation and wants to remain on this.  Improving appetite.  She has been 

going to some groups.  At this time patient denies any suicidal or homical 

ideations, intent or plan. Patient denies any auditory, visual hallucinations 

and denies any paranoia or delusions. Patient denies any side effects from the 

medications and has been compliant with meds. 





Mental Status Exam:


General Appearance: Patient appears to be dressed in home in street clothing, 

stated age is alert, difficult to redirect. Patient appears to have improving 

hygiene and grooming.


Behavior: Patient is seated without any agitated behavior.  Difficult to 

redirect, improving mildly


Speech: Patient's speech is rapid/pressured.  Rambles. 


Mood/Affect: Patient reports their mood is "ok", affect is congruent and 

constricted. 


Suicidality/Homicidality:  Patient denies having any homicidal ideation intent 

or plan. Denies any suicidal ideations intent or plan  


Perceptions: Patient denies any visual hallucinations at dinner and any auditory

hallucinations.


Though content/process: Tangential/circumstantial.  Rambling, flight of ideas, 

improving mildly.


Memory and concentration: AOX3, grossly intact for the purposes of this session


Judgment and insight: Chronically poor, improving mildly





IMPRESSIONS: 


Schizoaffective disorder, bipolar type


nicotine dependence





Plan:


-Patient continues to meet criteria for inpatient psychiatric admission for 

symptom stabilization and safety. Patient has not signed medication consent and 

was placed in patient's chart.


-Medications: Increase Depakene, 1000 mg daily at bedtime for mood 

stabilization.  Increase Risperdal to 3 mg twice a day for psychosis/mood 

stabilization.  Plan will be to transition patient onto long-acting injection to

ensure compliance.


-When necessary Ativan and Haldol for agitation/aggression.


-NRT - nicotine patch


-SW on board for discharge planning.  Encouraged the patient to participate in 

milieu. currently on an NADINE. will need to started on a BENSON prior to d.c. likely 

discharge next week.

## 2022-08-05 RX ADMIN — VALPROIC ACID SCH MG: 250 SOLUTION ORAL at 22:13

## 2022-08-05 RX ADMIN — VALPROIC ACID SCH MG: 250 SOLUTION ORAL at 20:58

## 2022-08-05 RX ADMIN — VALPROIC ACID SCH MG: 250 SOLUTION ORAL at 14:40

## 2022-08-05 NOTE — P.PN
Progress Note - Text


Progress Note Date: 08/05/22





Interval History:


Patient was seen wandering the hallways and was directable and agreeable to antonino schaefer with writer day.  Patient continues to ramble at times and was mildly more 

directable today during conversation.  She appears to be more logical today and 

her thought process.  She continues to speak about her medications and was 

resistant to having her Depakote increased.  She claims that she was taking her 

medications prior to coming into the hospital and appeared to be upset that 

people were saying that she wasn't.  She states that she is going to groups 

however was vague about what she is learning.  She claims that her mood has been

improving.  She continues to have an animal insight into her condition and need 

for treatment.  She is agreeable to continue on with her medications and we 

spoke about long-acting injection which will be given tomorrow which she was 

agreeable to.  Improving appetite. At this time patient denies any suicidal or 

homical ideations, intent or plan. Patient denies any auditory, visual 

hallucinations. Patient denies any side effects from the medications and has 

been compliant with meds. 





Mental Status Exam:


General Appearance: Patient appears to be dressed in home in street clothing, 

stated age is alert, difficult to redirect. Patient appears to have improving 

hygiene and grooming.


Behavior: Patient is seated without any agitated behavior.  Difficult to 

redirect, improving mildly


Speech: Patient's speech is rapid/pressured.  Rambles, improving mildly. 


Mood/Affect: Patient reports their mood is "great", affect is congruent 


Suicidality/Homicidality:  Patient denies having any homicidal ideation intent 

or plan. Denies any suicidal ideations intent or plan  


Perceptions: Patient denies any visual hallucinations at denies any auditory 

hallucinations.


Though content/process: Tangential/circumstantial.  Rambling, flight of ideas, 

improving mildly. more logical today and more directable


Memory and concentration: AOX3, grossly intact for the purposes of this session


Judgment and insight: Chronically poor, improving mildly





IMPRESSIONS: 


Schizoaffective disorder, bipolar type


nicotine dependence





Plan:


-Patient continues to meet criteria for inpatient psychiatric admission for 

symptom stabilization and safety. Patient has not signed medication consent and 

was placed in patient's chart.


-Medications: Increase Depakene, 1000 mg daily at bedtime +250 MG DAILY for mood

stabilization. Risperdal to 3 mg twice a day for psychosis/mood stabilization.  

Plan will be to transition patient onto long-acting injection and will give 

Invega Sustenna 234 mg IM loading dose saturday morning.


-check vpa level tomorrow.


-When necessary Ativan and Haldol for agitation/aggression.


-NRT - nicotine patch


-SW on board for discharge planning.  Encouraged the patient to participate in 

milieu. currently on an NADINE. will need to started on a BENSON prior to d.c. likely 

discharge next week.

## 2022-08-06 RX ADMIN — VALPROIC ACID SCH MG: 250 SOLUTION ORAL at 21:01

## 2022-08-06 RX ADMIN — VALPROIC ACID SCH MG: 250 SOLUTION ORAL at 10:08

## 2022-08-06 NOTE — P.PN
Progress Note - Text


Progress Note Date: 08/06/22


Interval History:


Patient was seen sitting in the hallway and was directable and agreeable to spe

ak with writer today.  Patient continues to ramble at times and still mildly 

pressured, but much more pleasant and is superficially cooperative.  She was 

compliant with her Invega Sustenna injection earlier today and denies medication

side effects. She reports good mood, sleep and appetite, however her insight and

judgment are chronically poor. At this time patient denies any suicidal or 

homicidal ideations, intent or plan. Patient denies any auditory, visual 

hallucinations. Patient denies any side effects from the medications and has 

been compliant with meds. She is focused on discharge.


Depakene level was drawn this morning and is therapeutic at 74.7 (range ).





Mental Status Exam:


General Appearance: Patient appears stated age, is dressed in casual attire with

fair hygiene.


Behavior: Patient is not agitated but does ramble. Superficially cooperative.  


Speech: Patient's speech is rapid/pressured, rambles, loud tone.


Mood/Affect: Patient reports their mood is "good", affect is constricted 


Suicidality/Homicidality: Patient denies having any suicidal or homicidal 

ideation intent or plan.  


Perceptions: Patient denies any visual hallucinations at denies any auditory 

hallucinations.


Though process: Rambling, but more logical today and more directable, stays on 

topic


Thought content: Stays on topic, talks about her medications and discharge


Memory and concentration: AOX3, grossly intact for the purposes of this session


Judgment and insight: Chronically poor, improving mildly





IMPRESSIONS: 


Schizoaffective disorder, bipolar type


Nicotine dependence





Plan:


-Patient continues to meet criteria for inpatient psychiatric admission for 

symptom stabilization and safety. 


-Medications: 


Continue Depakene 1000 mg daily at bedtime +250 MG DAILY for mood stabilization.




Continue Risperdal 3 mg twice a day for psychosis/mood stabilization for now to 

bridge.


Invega Sustenna 234 mg IM loading dose given this morning (8/6/22) and tolerated

well. She will need booster dose of 156 mg IM in 7 days. 


-Depakene level reviewed and is therapeutic at 74.7 (range ), however she 

may benefit from a slight adjustment to the dose when she is willing. 


-When necessary Ativan and Haldol for agitation/aggression.


-NRT - nicotine patch


-Encouraged the patient to participate in milieu.

## 2022-08-07 RX ADMIN — VALPROIC ACID SCH MG: 250 SOLUTION ORAL at 08:59

## 2022-08-07 RX ADMIN — VALPROIC ACID SCH MG: 250 SOLUTION ORAL at 20:38

## 2022-08-07 NOTE — P.PN
Progress Note - Text


Progress Note Date: 08/07/22


Interval History:


Patient was seen laying in her room and was agreeable to speak with writer zenon wu. Patient continues to be pressured and ramble, but is pleasant and appears 

superficially cooperative. She is compliant with her prescribed medications but 

when we discussed increasing her Depakene to 500 mg in the morning and 1000 mg 

at bedtime, she declines stating she does not want to have side effects. She 

denies side effects currently expect for some minor soreness at the Invega 

Sustenna injection site. She reports good mood, sleep and appetite, however her 

insight and judgment are chronically poor. At this time patient denies any 

suicidal or homicidal ideations, intent or plan. Patient denies any auditory, 

visual hallucinations. 





Mental Status Exam:


General Appearance: Patient appears stated age, is dressed in casual attire with

fair hygiene.


Behavior: Patient is not agitated but does ramble. Superficially cooperative.  


Speech: Patient's speech is rapid/pressured, rambles, loud tone.


Mood/Affect: Her mood appears elevated, affect is broad.


Suicidality/Homicidality: Patient denies having any suicidal or homicidal 

ideation intent or plan.  


Perceptions: Patient denies any visual hallucinations and denies any auditory 

hallucinations.


Though process: Rambling, but more logical today and more directable.


Thought content: Declines 


Memory and concentration: AOX3, grossly intact for the purposes of this session


Judgment and insight: Chronically poor, improving mildly





IMPRESSIONS: 


Schizoaffective disorder, bipolar type


Nicotine dependence





Plan:


-Patient continues to meet criteria for inpatient psychiatric admission for 

symptom stabilization and safety. 


-Medications: 


Continue Depakene 1000 mg daily at bedtime +250 MG DAILY for mood stabilization.

Patient is declining increase in dose at this time. 


Continue Risperdal 3 mg twice a day for psychosis/mood stabilization for now to 

bridge.


Invega Sustenna 234 mg IM loading dose given 8/6/22 and tolerated well. She will

need booster dose of 156 mg IM in 7 days. 


-When necessary Ativan and Haldol for agitation/aggression.


-NRT - nicotine patch


-Encouraged the patient to participate in milieu.

## 2022-08-08 RX ADMIN — VALPROIC ACID SCH MG: 250 SOLUTION ORAL at 09:03

## 2022-08-08 RX ADMIN — VALPROIC ACID SCH MG: 250 SOLUTION ORAL at 21:11

## 2022-08-08 NOTE — P.PN
Progress Note - Text


Progress Note Date: 08/08/22





Interval History:


Patient was seen wandering the hallways and was directable and agreeable to antonino schaefer with writer day.  Patient continues to ramble however this has been 

improving significantly.  She claims that she is doing "okay" and denied any 

problems over the weekend.  She continues to his sister mentioned illogical 

bizarre statements at times however this is been improving in frequency lately. 

She states that she is able to sleep better last night and hasn't improving 

appetite.  She is denying any depression or anxiety today.  Patient needs to 

demonstrate very poor insight and judgment which appeared to be chronically 

poor. She is agreeable to continue on with her medications and we spoke about 

long-acting injection.  At this time patient denies any suicidal or homical 

ideations, intent or plan. Patient denies any auditory, visual hallucinations. 

Patient denies any side effects from the medications and has been compliant with

meds. 





Mental Status Exam:


General Appearance: Patient appears to be dressed in home in street clothing, 

stated age is alert, difficult to redirect. Patient appears to have improving 

hygiene and grooming.


Behavior: Patient is seated without any agitated behavior.  Difficult to 

redirect, improving mildly


Speech: Patient's speech is rapid/pressured.  Rambles, improving mildly. 


Mood/Affect: Patient reports their mood is "good", affect is congruent 


Suicidality/Homicidality:  Patient denies having any homicidal ideation intent 

or plan. Denies any suicidal ideations intent or plan  


Perceptions: Patient denies any visual hallucinations at denies any auditory 

hallucinations.


Though content/process: Tangential/circumstantial.  Rambling, flight of ideas, 

improving mildly. more logical today and more directable


Memory and concentration: AOX3, grossly intact for the purposes of this session


Judgment and insight: Chronically poor, improving mildly





IMPRESSIONS: 


Schizoaffective disorder, bipolar type


nicotine dependence





Plan:


-Patient continues to meet criteria for inpatient psychiatric admission for 

symptom stabilization and safety. Patient has not signed medication consent and 

was placed in patient's chart.


-Medications: Depakene, 1000 mg daily at bedtime +250 MG DAILY for mood 

stabilization. decrease Risperdal 2 mg twice a day for psychosis/mood 

stabilization.  given Invega Sustenna 234 mg IM loading dose on 8/6 and will be 

due for her next dose on 8/10


-When necessary Ativan and Haldol for agitation/aggression.


-NRT - nicotine patch


-SW on board for discharge planning.  Encouraged the patient to participate in 

milieu. currently on an ANDINE. will need to started on a BENSON prior to d.c. likely 

discharge in 2-3 days after [facility] places patient in a group home.

## 2022-08-09 RX ADMIN — VALPROIC ACID SCH MG: 250 SOLUTION ORAL at 20:04

## 2022-08-09 RX ADMIN — VALPROIC ACID SCH MG: 250 SOLUTION ORAL at 08:39

## 2022-08-09 NOTE — P.PN
Progress Note - Text


Progress Note Date: 08/09/22





Interval History:


Patient was seen wandering the hallways and was directable and agreeable to sp

silvano with writer day.  Patient continues to ramble at times during the interview 

however appears to be more directable today.  She also appears to be more 

logical and was not endorsing delusions today.  She continues to demonstrate 

chronically poor insight and judgment.  She believes that she does not have a 

guardian to make her own decisions.  She spoke about different places where she 

liked to stay when she is discharged and continues to deny any depression or 

anxiety today.  She claims that she started going to groups and participated 

this as she can.  She states that her mood and anxiety been improving.  She 

claims her appetite is been improving as well.  We spoke about patient receiving

the long-acting injection tomorrow and potential for discharge shortly after.  

At this time patient denies any suicidal or homical ideations, intent or plan. 

Patient denies any auditory, visual hallucinations. Patient denies any side 

effects from the medications and has been compliant with meds. 





Mental Status Exam:


General Appearance: Patient appears to be dressed in home in street clothing, 

stated age is alert, difficult to redirect however is improving. Patient appears

to have improving hygiene and grooming.


Behavior: Patient is seated without any agitated behavior.  Difficult to 

redirect, improving mildly


Speech: Patient's speech is rapid/pressured.  Rambles, improving mildly. 


Mood/Affect: Patient reports their mood is "ok", affect is congruent 


Suicidality/Homicidality:  Patient denies having any homicidal ideation intent 

or plan. Denies any suicidal ideations intent or plan  


Perceptions: Patient denies any visual hallucinations at denies any auditory 

hallucinations.


Though content/process: Tangential/circumstantial.  Rambling, improving mildly. 

more logical today and more directable


Memory and concentration: AOX3, grossly intact for the purposes of this session


Judgment and insight: Chronically poor, improving mildly





IMPRESSIONS: 


Schizoaffective disorder, bipolar type


nicotine dependence





Plan:


-Patient continues to meet criteria for inpatient psychiatric admission for 

symptom stabilization and safety. Patient has not signed medication consent and 

was placed in patient's chart.


-Medications: Depakene, 1000 mg daily at bedtime +250 MG DAILY for mood 

stabilization. Risperdal 2 mg twice a day for psychosis/mood stabilization will 

continue decreasing tomorrow.  given Invega Sustenna 234 mg IM loading dose on 

8/6 and will be due for her next dose on 8/10


-When necessary Ativan and Haldol for agitation/aggression.


-NRT - nicotine patch


-SW on board for discharge planning.  Encouraged the patient to participate in 

milieu. currently on an NADINE.  likely discharge tomorrow after second BENSON and 

patient will go to a group home.

## 2022-08-10 VITALS
DIASTOLIC BLOOD PRESSURE: 74 MMHG | HEART RATE: 107 BPM | RESPIRATION RATE: 14 BRPM | TEMPERATURE: 97.4 F | SYSTOLIC BLOOD PRESSURE: 120 MMHG

## 2022-08-10 RX ADMIN — VALPROIC ACID SCH MG: 250 SOLUTION ORAL at 07:43

## 2022-08-10 NOTE — P.DS
Providers


Date of admission: 


08/01/22 22:55





Expected date of discharge: 08/10/22


Attending physician: 


Ciro Busby MD





Consults: 





                                        





08/01/22 23:17


Consult Physician Routine 


   Consulting Provider: Jose L Block


   Consult Reason/Comments: H&P and medical


   Do you want consulting provider notified?: Yes











Primary care physician: 


Clare Alvarez








- Discharge Diagnosis(es)


(1) Schizoaffective disorder, bipolar type


Current Visit: Yes   Status: Acute   Priority: High   





(2) Nicotine dependence


Current Visit: Yes   Status: Acute   Priority: Low   


Hospital Course: 





Admission HPI:


Admission note was completed by [writer] "[Patient is a 57-year-old  

female currently , homeless and has one son. Patient presented to the 

hospital yesterday on a pickup order currently on a mental health treatment in 

order.  Patient apparently was a poor historian and uncooperative in the ER and 

did not believe that she was on a court order.  Urine drug screen was negative. 

 Patient was admitted last to the mental health unit less than a month ago and 

was discharged on Depakote and Seroquel.  She signed a waive and stip at that 

time with her  and agreed to be on a order.  Patient apparently did not 

take any medications once she left the hospital.  Today she was seen wandering 

the hallways and agreeable .  She was responding to internal 

stimuli.  She was rambling, tangential and has loose associations.  She had a 

flight of ideas.  She was bizarre in her speech and thought content.  She spoke 

negatively about her medication and states that "they made me sick" and also 

claims that she does not need them any longer.  Very poor insight and judgment. 

 Difficult to redirect during conversation.  Denies any auditory or visual 

hallucinations.  Denies any suicidal or homicidal ideations intent or plan.  

Claims that she is smoking cigarettes only, denies any other recreational drug 

use.]"





Hospital course:


Upon admission to the unit patient was [admitted on an active treatment order]. 

Patient got along well with other patients on the unit and followed unit 

protocol.  Patient was compliant with the medications and denied any side 

effects throughout hospital course.  Patient was started on Risperdal and 

increased the dose of 3 mg twice a day for psychosis. Patient was given Invega 

Sustenna loading dose 234 mg IM on 8/6 and will receive her next dose of 156 mg 

IM on 8/10. Patient will be given her next monthly dose on 8/31 of 234 mg IM. 

Patient was also started on depakote  mg daily + 1000 mg qhs for mood 

stabilization. Patient spoke of her stressors and engaged in therapy both group 

and individual.  Patient was also seen by medical team for history and physical 

exam. [] Throughout the course of the hospitalization patient gradually improved

 with regards to psychosis, mood and anxiety, sleep and [returned back to their 

baseline level of functioning]. On the day of discharge patient denied any 

suicidal or homicidal ideations intent or plan denied any auditory or visual 

hallucinations. Patient endorsed wanting to live for her health and her future. 

The patient denied any access to guns or weapons.  Patient denied any paranoia 

and did not endorse any delusions. Patient does [not] have a significant history

 of substance abuse [and] was counseled on abstaining from all substances 

including alcohol and marijuana. Patient was also counseled on the medications 

and need for regular compliance and was encouraged to follow-up with their 

outpatient appointment for mental health and also for primary care. [Prior to 

discharge a family meeting with her sravan will be arranged by  

to answer any questions and ensure safety upon discharge.] PAtient will be 

discharged today to group home.





Mental status exam:


General Appearance: Patient appears to be []stated age is alert, pleasant, and 

cooperative. Patient is in no acute distress and has improved hygiene and 

grooming 


Behavior: Patient is calmly seated without any agitated behavior.


Speech: Patient's speech is fluent and nonpressured. rambles at times.


Mood/Affect: Patient reports their mood is "[better]", affect is congruent and 

euthymic. 


Suicidality/Homicidality:  Patient denies having any suicidal or homicidal 

ideation intent or plan.  


Perceptions: Patient denies any auditory or visual hallucinations.  


Though content/process: There is no evidence of any delusional thought content 

and thought process is linear and goal-directed.


Memory and concentration: AOX3, grossly intact for the purposes of this session.

 Can spell "WORLD" backwards correctly.


Judgment and insight: [chronically poor, however has] improved with guarded 

prognosis





Impression:


schizoaffective disorder, bipolar type


[Nicotine dependence]





Plan:


-Continue with discharge today as patient has improved and stabilized 

psychiatrically and is not currently an imminent threat to [herself] and/or 

others. [Patient will remain at chronically elevated risk for harm to self 

and/or others due to her impulsivity and chronically poor insight.]


-Continue medications: []d/c PO Invega. Given Invega Sustenna loading dose 234 

mg IM on 8/6 and will receive her next dose of 156 mg IM on 8/10. Patient will 

be given her next monthly dose on 8/31 of 234 mg IM. continue with Depakote ER 

500 mg daily + 1000 mg qhs for mood stabilization.


-Patient was counseled on the need for medication compliance and appropriate 

follow-up at mental health and also primary care for medical issues.  Patient 

verbalized understanding and agreed.


-Social work to [arrange for and conduct family meeting to ensure safety upon 

discharge and answer any questions/concerns.] Social work also to arrange for 

patients follow up appointments [with [facility]] for psychiatric care along with 

follow up with primary care provider.


-Patient counseled on abstaining from recreational drugs and marijuana and 

alcohol. Was informed/educated on the adverse effects on their physical and 

mental health. [Patient verbally agreed and understood]. 


-Patient was instructed to return to the hospital or seek immediate medical care

if their psychiatric or medical symptoms do worsen or reoccur.








                                    Allergies











Allergy/AdvReac Type Severity Reaction Status Date / Time


 


No Known Allergies Allergy   Verified 08/01/22 13:45











                               Laboratory Results











Urine Opiates Screen  Not Detected  (NotDetected)   08/01/22  13:06    


 


Ur Oxycodone Screen  Not Detected  (NotDetected)   08/01/22  13:06    


 


Urine Methadone Screen  Not Detected  (NotDetected)   08/01/22  13:06    


 


Ur Propoxyphene Screen  Not Detected  (NotDetected)   08/01/22  13:06    


 


Ur Barbiturates Screen  Not Detected  (NotDetected)   08/01/22  13:06    


 


Valproic Acid  74.7 ug/mL  08/06/22  07:44    


 


U Tricyclic Antidepress  Not Detected  (NotDetected)   08/01/22  13:06    


 


Ur Phencyclidine Scrn  Not Detected  (NotDetected)   08/01/22  13:06    


 


Ur Amphetamines Screen  Not Detected  (NotDetected)   08/01/22  13:06    


 


U Methamphetamines Scrn  Not Detected  (NotDetected)   08/01/22  13:06    


 


U Benzodiazepines Scrn  Not Detected  (NotDetected)   08/01/22  13:06    


 


Urine Cocaine Screen  Not Detected  (NotDetected)   08/01/22  13:06    


 


U Marijuana (THC) Screen  Not Detected  (NotDetected)   08/01/22  13:06    


 


Coronavirus (PCR)  Not Detected  (Not Detectd)   08/10/22  07:47    











                                   Vital Signs











Temp  97.4 F L  08/10/22 03:39


 


Pulse  107 H  08/10/22 03:39


 


Resp  14   08/10/22 03:39


 


BP  120/74   08/10/22 03:39


 


Pulse Ox  99   08/08/22 06:59


 


FiO2      











Patient Condition at Discharge: Stable





Plan - Discharge Summary


Discharge Rx Participant: No


New Discharge Prescriptions: 


New


   Divalproex ER [Depakote ER] 500 mg PO DAILY 30 Days #30 tab


   Paliperidone IM [Invega Sustenna] 234 mg IM QMONTHLY #1 each





Continue


   Divalproex ER [Depakote ER] 1,000 mg PO HS 30 Days #60 tab





Discontinued


   QUEtiapine [SEROquel] 300 mg PO HS 30 Days  tab


Discharge Medication List





Divalproex ER [Depakote ER] 1,000 mg PO HS 30 Days #60 tab 08/10/22 [Rx]


Divalproex ER [Depakote ER] 500 mg PO DAILY 30 Days #30 tab 08/10/22 [Rx]


Paliperidone IM [Invega Sustenna] 234 mg IM QMONTHLY #1 each 08/10/22 [Rx]








Follow up Appointment(s)/Referral(s): 


[facility] [Outside] - 08/10/22 9:30 am


(8-10-22 at 9:30 with Purvi Barba at [facility]





8-17-22 at 1:30 with NP Mark Saunders for psych eval





 )


Clare Alvarez MD [Primary Care Provider] - 1-2 days


Patient Instructions/Handouts:  How to Stop Smoking (DC), Schizoaffective 

Disorder (DC)


Activity/Diet/Wound Care/Special Instructions: 


Avoid the use of street drugs and alcohol.  Take all prescriptions as 

prescribed.  When you are in need of refills on your medications, please contact

your medical provider and/or outpatient psychiatrist to have this done.  Please 

go to scheduled outpatient appointment for aftercare treatment.  If symptoms 

return or become worse, call the crisis line at [phone] and/or go to the 

nearest emergency room for evaluation. 


Discharge Disposition: OTHER INSTITUTION NOT DEFINED

## 2022-11-16 ENCOUNTER — HOSPITAL ENCOUNTER (OUTPATIENT)
Dept: HOSPITAL 47 - LABWHC1 | Age: 57
Discharge: HOME | End: 2022-11-16
Attending: PSYCHIATRY & NEUROLOGY
Payer: MEDICARE

## 2022-11-16 DIAGNOSIS — F25.0: Primary | ICD-10-CM

## 2022-11-16 DIAGNOSIS — Z79.899: ICD-10-CM

## 2022-11-16 LAB
ALBUMIN SERPL-MCNC: 4.5 G/DL (ref 3.8–4.9)
ALBUMIN/GLOB SERPL: 1.55 G/DL (ref 1.6–3.17)
ALP SERPL-CCNC: 67 U/L (ref 41–126)
ALT SERPL-CCNC: 17 U/L (ref 8–44)
AST SERPL-CCNC: 19 U/L (ref 13–35)
BUN SERPL-SCNC: 10.7 MG/DL (ref 9–27)
CHOLEST SERPL-MCNC: 198 MG/DL (ref 0–200)
GLOBULIN SER CALC-MCNC: 2.9 G/DL (ref 1.6–3.3)
GLUCOSE SERPL-MCNC: 106 MG/DL (ref 70–110)
HDLC SERPL-MCNC: 60.9 MG/DL (ref 40–60)
LDLC SERPL CALC-MCNC: 120.7 MG/DL (ref 0–131)
LITHIUM SERPL-MCNC: 1.2 MMOL/L (ref 0.5–1.2)
PROT SERPL-MCNC: 7.4 G/DL (ref 6.2–8.2)
T4 FREE SERPL-MCNC: 0.97 NG/DL (ref 0.8–1.8)
TRIGL SERPL-MCNC: 82.1 MG/DL (ref 0–149)
VLDLC SERPL CALC-MCNC: 16.42 MG/DL (ref 5–40)

## 2022-11-16 PROCEDURE — 84443 ASSAY THYROID STIM HORMONE: CPT

## 2022-11-16 PROCEDURE — 80178 ASSAY OF LITHIUM: CPT

## 2022-11-16 PROCEDURE — 82565 ASSAY OF CREATININE: CPT

## 2022-11-16 PROCEDURE — 80076 HEPATIC FUNCTION PANEL: CPT

## 2022-11-16 PROCEDURE — 36415 COLL VENOUS BLD VENIPUNCTURE: CPT

## 2022-11-16 PROCEDURE — 83036 HEMOGLOBIN GLYCOSYLATED A1C: CPT

## 2022-11-16 PROCEDURE — 84520 ASSAY OF UREA NITROGEN: CPT

## 2022-11-16 PROCEDURE — 82947 ASSAY GLUCOSE BLOOD QUANT: CPT

## 2022-11-16 PROCEDURE — 84439 ASSAY OF FREE THYROXINE: CPT

## 2022-11-16 PROCEDURE — 80061 LIPID PANEL: CPT

## 2023-06-15 ENCOUNTER — HOSPITAL ENCOUNTER (OUTPATIENT)
Dept: HOSPITAL 47 - LABWHC1 | Age: 58
Discharge: HOME | End: 2023-06-15
Attending: PSYCHIATRY & NEUROLOGY
Payer: COMMERCIAL

## 2023-06-15 DIAGNOSIS — F25.0: Primary | ICD-10-CM

## 2023-06-15 DIAGNOSIS — Z79.899: ICD-10-CM

## 2023-06-15 LAB
ALBUMIN SERPL-MCNC: 4.3 D/DL (ref 3.8–4.9)
ALBUMIN/GLOB SERPL: 1.65 RATIO (ref 1.6–3.17)
ALP SERPL-CCNC: 82 U/L (ref 41–126)
ALT SERPL-CCNC: 38 U/L (ref 8–44)
ANION GAP SERPL CALC-SCNC: 14.8 MMOL/L (ref 4–12)
AST SERPL-CCNC: 23 U/L (ref 13–35)
BASOPHILS # BLD AUTO: 0.08 X 10*3/UL (ref 0–0.1)
BASOPHILS NFR BLD AUTO: 0.8 %
BUN SERPL-SCNC: 5.6 MG/DL (ref 9–27)
BUN/CREAT SERPL: 9.33 RATIO (ref 12–20)
CALCIUM SPEC-MCNC: 9.4 MG/DL (ref 8.7–10.3)
CHLORIDE SERPL-SCNC: 103 MMOL/L (ref 96–109)
CHOLEST SERPL-MCNC: 209 MG/DL (ref 0–200)
CO2 SERPL-SCNC: 24.2 MMOL/L (ref 21.6–31.8)
EOSINOPHIL # BLD AUTO: 0.1 X 10*3/UL (ref 0.04–0.35)
EOSINOPHIL NFR BLD AUTO: 1 %
ERYTHROCYTE [DISTWIDTH] IN BLOOD BY AUTOMATED COUNT: 4.32 X 10*6/UL (ref 4.1–5.2)
ERYTHROCYTE [DISTWIDTH] IN BLOOD: 13 % (ref 11.5–14.5)
GLOBULIN SER CALC-MCNC: 2.6 D/DL (ref 1.6–3.3)
GLUCOSE SERPL-MCNC: 106 MG/DL (ref 70–110)
HCT VFR BLD AUTO: 40.6 % (ref 37.2–46.3)
HDLC SERPL-MCNC: 56.1 MG/DL (ref 40–60)
HGB BLD-MCNC: 13 D/DL (ref 12–15)
IMM GRANULOCYTES BLD QL AUTO: 0.6 %
LDLC SERPL CALC-MCNC: 133.3 MG/DL (ref 0–131)
LITHIUM SERPL-MCNC: 0.5 MMOL/L (ref 0.5–1.2)
LYMPHOCYTES # SPEC AUTO: 2.13 X 10*3/UL (ref 0.9–5)
LYMPHOCYTES NFR SPEC AUTO: 21 %
MCH RBC QN AUTO: 30.1 PG (ref 27–32)
MCHC RBC AUTO-ENTMCNC: 32 D/DL (ref 32–37)
MCV RBC AUTO: 94 FL (ref 80–97)
MONOCYTES # BLD AUTO: 0.73 X 10*3/UL (ref 0.2–1)
MONOCYTES NFR BLD AUTO: 7.2 %
NEUTROPHILS # BLD AUTO: 7.04 X 10*3/UL (ref 1.8–7.7)
NEUTROPHILS NFR BLD AUTO: 69.4 %
NRBC BLD AUTO-RTO: 0 X 10*3/UL (ref 0–0.01)
PLATELET # BLD AUTO: 211 X 10*3/UL (ref 140–440)
POTASSIUM SERPL-SCNC: 4.2 MMOL/L (ref 3.5–5.5)
PROT SERPL-MCNC: 6.9 D/DL (ref 6.2–8.2)
SODIUM SERPL-SCNC: 142 MMOL/L (ref 135–145)
T4 FREE SERPL-MCNC: 1.18 NG/DL (ref 0.8–1.8)
TRIGL SERPL-MCNC: 98.2 MG/DL (ref 0–149)
VLDLC SERPL CALC-MCNC: 19.64 MG/DL (ref 5–40)
WBC # BLD AUTO: 10.14 X 10*3/UL (ref 4.5–10)

## 2023-06-15 PROCEDURE — 80061 LIPID PANEL: CPT

## 2023-06-15 PROCEDURE — 84443 ASSAY THYROID STIM HORMONE: CPT

## 2023-06-15 PROCEDURE — 85025 COMPLETE CBC W/AUTO DIFF WBC: CPT

## 2023-06-15 PROCEDURE — 80164 ASSAY DIPROPYLACETIC ACD TOT: CPT

## 2023-06-15 PROCEDURE — 80178 ASSAY OF LITHIUM: CPT

## 2023-06-15 PROCEDURE — 82306 VITAMIN D 25 HYDROXY: CPT

## 2023-06-15 PROCEDURE — 80053 COMPREHEN METABOLIC PANEL: CPT

## 2023-06-15 PROCEDURE — 36415 COLL VENOUS BLD VENIPUNCTURE: CPT

## 2023-06-15 PROCEDURE — 84439 ASSAY OF FREE THYROXINE: CPT

## 2023-09-18 ENCOUNTER — HOSPITAL ENCOUNTER (OUTPATIENT)
Dept: HOSPITAL 47 - RADUSWWP | Age: 58
Discharge: HOME | End: 2023-09-18
Attending: FAMILY MEDICINE
Payer: MEDICARE

## 2023-09-18 DIAGNOSIS — N28.89: Primary | ICD-10-CM

## 2023-09-18 LAB
ALBUMIN SERPL-MCNC: 4.9 D/DL (ref 3.8–4.9)
ALBUMIN/GLOB SERPL: 1.75 RATIO (ref 1.6–3.17)
ALP SERPL-CCNC: 91 U/L (ref 41–126)
ALT SERPL-CCNC: 36 U/L (ref 8–44)
ANION GAP SERPL CALC-SCNC: 12.9 MMOL/L (ref 4–12)
AST SERPL-CCNC: 20 U/L (ref 13–35)
BASOPHILS # BLD AUTO: 0.09 X 10*3/UL (ref 0–0.1)
BASOPHILS NFR BLD AUTO: 1 %
BUN SERPL-SCNC: 8.6 MG/DL (ref 9–27)
BUN/CREAT SERPL: 14.33 RATIO (ref 12–20)
CALCIUM SPEC-MCNC: 9.9 MG/DL (ref 8.7–10.3)
CHLORIDE SERPL-SCNC: 99 MMOL/L (ref 96–109)
CO2 SERPL-SCNC: 27.1 MMOL/L (ref 21.6–31.8)
EOSINOPHIL # BLD AUTO: 0.08 X 10*3/UL (ref 0.04–0.35)
EOSINOPHIL NFR BLD AUTO: 0.8 %
ERYTHROCYTE [DISTWIDTH] IN BLOOD BY AUTOMATED COUNT: 4.81 X 10*6/UL (ref 4.1–5.2)
ERYTHROCYTE [DISTWIDTH] IN BLOOD: 13.7 % (ref 11.5–14.5)
GLOBULIN SER CALC-MCNC: 2.8 D/DL (ref 1.6–3.3)
GLUCOSE SERPL-MCNC: 148 MG/DL (ref 70–110)
HCT VFR BLD AUTO: 44.3 % (ref 37.2–46.3)
HGB BLD-MCNC: 14 D/DL (ref 12–15)
IMM GRANULOCYTES BLD QL AUTO: 0.7 %
LYMPHOCYTES # SPEC AUTO: 1.85 X 10*3/UL (ref 0.9–5)
LYMPHOCYTES NFR SPEC AUTO: 19.6 %
MCH RBC QN AUTO: 29.1 PG (ref 27–32)
MCHC RBC AUTO-ENTMCNC: 31.6 D/DL (ref 32–37)
MCV RBC AUTO: 92.1 FL (ref 80–97)
MONOCYTES # BLD AUTO: 0.76 X 10*3/UL (ref 0.2–1)
MONOCYTES NFR BLD AUTO: 8 %
NEUTROPHILS # BLD AUTO: 6.61 X 10*3/UL (ref 1.8–7.7)
NEUTROPHILS NFR BLD AUTO: 69.9 %
NRBC BLD AUTO-RTO: 0 X 10*3/UL (ref 0–0.01)
PH UR: 7 [PH]
PLATELET # BLD AUTO: 194 X 10*3/UL (ref 140–440)
POTASSIUM SERPL-SCNC: 4.7 MMOL/L (ref 3.5–5.5)
PROT SERPL-MCNC: 7.7 D/DL (ref 6.2–8.2)
SODIUM SERPL-SCNC: 139 MMOL/L (ref 135–145)
SP GR UR: 1 (ref 1–1.03)
UROBILINOGEN UR QL: 0.2 E.U./DL
WBC # BLD AUTO: 9.46 X 10*3/UL (ref 4.5–10)

## 2023-09-18 PROCEDURE — 81003 URINALYSIS AUTO W/O SCOPE: CPT

## 2023-09-18 PROCEDURE — 80053 COMPREHEN METABOLIC PANEL: CPT

## 2023-09-18 PROCEDURE — 76770 US EXAM ABDO BACK WALL COMP: CPT

## 2023-09-18 PROCEDURE — 84100 ASSAY OF PHOSPHORUS: CPT

## 2023-09-18 PROCEDURE — 85025 COMPLETE CBC W/AUTO DIFF WBC: CPT

## 2023-09-18 NOTE — US
EXAMINATION TYPE: US kidneys/renal and bladder

 

DATE OF EXAM: 9/18/2023

 

COMPARISON: NONE

 

CLINICAL INDICATION: Female, 58 years old with history of N28.9 DISORDER OF KIDNEY AND URETER, UNSPEC
IFIED; Decreased renal function

 

EXAM MEASUREMENTS:

 

Right Kidney:  12.1 x 5.5 x 4.9 cm

Left Kidney: 12.4 x 5.6 x 4.6 cm

Post Void Residual Volume:  27 mL

 

 

 

Right Kidney: Mild pelvocaliectasis.

Left Kidney: Appears wnl  

Bladder: wnl

**Bilateral Jets seen: yes

**Normal Post Void Residual: yes

 

There is no evidence for hydronephrosis at this point in time.  Mild right pelvocaliectasis. No nephr
olithiasis is seen.  No masses are identified. Cortical medullary differentiation is maintained bilat
erally.  The urinary bladder is anechoic.  Bilateral ureteral jets are seen.

 

 

 

IMPRESSION:

Mild right pelvocaliectasis without overt hydronephrosis.

## 2025-04-22 ENCOUNTER — HOSPITAL ENCOUNTER (OUTPATIENT)
Dept: HOSPITAL 47 - RADCTMAIN | Age: 60
Discharge: HOME | End: 2025-04-22
Attending: FAMILY MEDICINE
Payer: COMMERCIAL

## 2025-04-22 DIAGNOSIS — R92.333: ICD-10-CM

## 2025-04-22 DIAGNOSIS — F17.210: ICD-10-CM

## 2025-04-22 DIAGNOSIS — Z80.3: ICD-10-CM

## 2025-04-22 DIAGNOSIS — Z78.0: ICD-10-CM

## 2025-04-22 DIAGNOSIS — Z12.2: ICD-10-CM

## 2025-04-22 DIAGNOSIS — Z12.31: Primary | ICD-10-CM

## 2025-04-22 PROCEDURE — 77067 SCR MAMMO BI INCL CAD: CPT

## 2025-04-22 PROCEDURE — 71271 CT THORAX LUNG CANCER SCR C-: CPT
